# Patient Record
Sex: MALE | Employment: FULL TIME | URBAN - METROPOLITAN AREA
[De-identification: names, ages, dates, MRNs, and addresses within clinical notes are randomized per-mention and may not be internally consistent; named-entity substitution may affect disease eponyms.]

---

## 2019-07-28 ENCOUNTER — APPOINTMENT (OUTPATIENT)
Dept: ULTRASOUND IMAGING | Facility: CLINIC | Age: 59
End: 2019-07-28
Attending: EMERGENCY MEDICINE
Payer: COMMERCIAL

## 2019-07-28 ENCOUNTER — HOSPITAL ENCOUNTER (EMERGENCY)
Facility: CLINIC | Age: 59
Discharge: HOME | End: 2019-07-28
Attending: EMERGENCY MEDICINE | Admitting: EMERGENCY MEDICINE
Payer: COMMERCIAL

## 2019-07-28 VITALS
OXYGEN SATURATION: 99 % | HEIGHT: 70 IN | RESPIRATION RATE: 16 BRPM | WEIGHT: 175 LBS | DIASTOLIC BLOOD PRESSURE: 82 MMHG | HEART RATE: 58 BPM | TEMPERATURE: 97 F | BODY MASS INDEX: 25.05 KG/M2 | SYSTOLIC BLOOD PRESSURE: 143 MMHG

## 2019-07-28 DIAGNOSIS — N45.1 EPIDIDYMITIS: ICD-10-CM

## 2019-07-28 LAB
ALBUMIN UR-MCNC: NEGATIVE MG/DL
ANION GAP SERPL CALCULATED.3IONS-SCNC: 2 MMOL/L (ref 3–14)
APPEARANCE UR: CLEAR
BASOPHILS # BLD AUTO: 0 10E9/L (ref 0–0.2)
BASOPHILS NFR BLD AUTO: 0.4 %
BILIRUB UR QL STRIP: NEGATIVE
BUN SERPL-MCNC: 14 MG/DL (ref 7–30)
CALCIUM SERPL-MCNC: 8.6 MG/DL (ref 8.5–10.1)
CHLORIDE SERPL-SCNC: 108 MMOL/L (ref 94–109)
CO2 SERPL-SCNC: 31 MMOL/L (ref 20–32)
COLOR UR AUTO: YELLOW
CREAT SERPL-MCNC: 1.05 MG/DL (ref 0.66–1.25)
DIFFERENTIAL METHOD BLD: ABNORMAL
EOSINOPHIL # BLD AUTO: 0.1 10E9/L (ref 0–0.7)
EOSINOPHIL NFR BLD AUTO: 1.6 %
ERYTHROCYTE [DISTWIDTH] IN BLOOD BY AUTOMATED COUNT: 14.1 % (ref 10–15)
GFR SERPL CREATININE-BSD FRML MDRD: 78 ML/MIN/{1.73_M2}
GLUCOSE SERPL-MCNC: 107 MG/DL (ref 70–99)
GLUCOSE UR STRIP-MCNC: NEGATIVE MG/DL
HCT VFR BLD AUTO: 39.9 % (ref 40–53)
HGB BLD-MCNC: 13.1 G/DL (ref 13.3–17.7)
HGB UR QL STRIP: NEGATIVE
IMM GRANULOCYTES # BLD: 0 10E9/L (ref 0–0.4)
IMM GRANULOCYTES NFR BLD: 0.2 %
KETONES UR STRIP-MCNC: 10 MG/DL
LEUKOCYTE ESTERASE UR QL STRIP: NEGATIVE
LYMPHOCYTES # BLD AUTO: 1.8 10E9/L (ref 0.8–5.3)
LYMPHOCYTES NFR BLD AUTO: 32.2 %
MCH RBC QN AUTO: 27.1 PG (ref 26.5–33)
MCHC RBC AUTO-ENTMCNC: 32.8 G/DL (ref 31.5–36.5)
MCV RBC AUTO: 83 FL (ref 78–100)
MONOCYTES # BLD AUTO: 0.3 10E9/L (ref 0–1.3)
MONOCYTES NFR BLD AUTO: 5.5 %
NEUTROPHILS # BLD AUTO: 3.3 10E9/L (ref 1.6–8.3)
NEUTROPHILS NFR BLD AUTO: 60.1 %
NITRATE UR QL: NEGATIVE
NRBC # BLD AUTO: 0 10*3/UL
NRBC BLD AUTO-RTO: 0 /100
PH UR STRIP: 8 PH (ref 5–7)
PLATELET # BLD AUTO: 220 10E9/L (ref 150–450)
POTASSIUM SERPL-SCNC: 3.5 MMOL/L (ref 3.4–5.3)
RBC # BLD AUTO: 4.83 10E12/L (ref 4.4–5.9)
RBC #/AREA URNS AUTO: 1 /HPF (ref 0–2)
SODIUM SERPL-SCNC: 141 MMOL/L (ref 133–144)
SOURCE: ABNORMAL
SP GR UR STRIP: 1.01 (ref 1–1.03)
UROBILINOGEN UR STRIP-MCNC: NORMAL MG/DL (ref 0–2)
WBC # BLD AUTO: 5.5 10E9/L (ref 4–11)
WBC #/AREA URNS AUTO: 1 /HPF (ref 0–5)

## 2019-07-28 PROCEDURE — 96374 THER/PROPH/DIAG INJ IV PUSH: CPT

## 2019-07-28 PROCEDURE — 85025 COMPLETE CBC W/AUTO DIFF WBC: CPT | Performed by: EMERGENCY MEDICINE

## 2019-07-28 PROCEDURE — 96360 HYDRATION IV INFUSION INIT: CPT

## 2019-07-28 PROCEDURE — 99285 EMERGENCY DEPT VISIT HI MDM: CPT | Mod: 25

## 2019-07-28 PROCEDURE — 25000128 H RX IP 250 OP 636: Performed by: EMERGENCY MEDICINE

## 2019-07-28 PROCEDURE — 93976 VASCULAR STUDY: CPT

## 2019-07-28 PROCEDURE — 80048 BASIC METABOLIC PNL TOTAL CA: CPT | Performed by: EMERGENCY MEDICINE

## 2019-07-28 PROCEDURE — 81001 URINALYSIS AUTO W/SCOPE: CPT | Performed by: EMERGENCY MEDICINE

## 2019-07-28 PROCEDURE — 96375 TX/PRO/DX INJ NEW DRUG ADDON: CPT

## 2019-07-28 PROCEDURE — 96372 THER/PROPH/DIAG INJ SC/IM: CPT

## 2019-07-28 RX ORDER — DOXYCYCLINE 100 MG/1
100 CAPSULE ORAL 2 TIMES DAILY
Qty: 20 CAPSULE | Refills: 0 | Status: SHIPPED | OUTPATIENT
Start: 2019-07-28 | End: 2019-08-07

## 2019-07-28 RX ORDER — ONDANSETRON 2 MG/ML
4 INJECTION INTRAMUSCULAR; INTRAVENOUS ONCE
Status: COMPLETED | OUTPATIENT
Start: 2019-07-28 | End: 2019-07-28

## 2019-07-28 RX ORDER — KETOROLAC TROMETHAMINE 30 MG/ML
30 INJECTION, SOLUTION INTRAMUSCULAR; INTRAVENOUS ONCE
Status: COMPLETED | OUTPATIENT
Start: 2019-07-28 | End: 2019-07-28

## 2019-07-28 RX ORDER — CEFTRIAXONE SODIUM 250 MG
250 VIAL (EA) INJECTION ONCE
Status: COMPLETED | OUTPATIENT
Start: 2019-07-28 | End: 2019-07-28

## 2019-07-28 RX ORDER — OXYCODONE AND ACETAMINOPHEN 5; 325 MG/1; MG/1
1-2 TABLET ORAL EVERY 4 HOURS PRN
Qty: 12 TABLET | Refills: 0 | Status: SHIPPED | OUTPATIENT
Start: 2019-07-28 | End: 2021-11-16

## 2019-07-28 RX ORDER — MORPHINE SULFATE 4 MG/ML
4 INJECTION, SOLUTION INTRAMUSCULAR; INTRAVENOUS ONCE
Status: COMPLETED | OUTPATIENT
Start: 2019-07-28 | End: 2019-07-28

## 2019-07-28 RX ADMIN — KETOROLAC TROMETHAMINE 30 MG: 30 INJECTION, SOLUTION INTRAMUSCULAR at 16:47

## 2019-07-28 RX ADMIN — MORPHINE SULFATE 4 MG: 4 INJECTION INTRAVENOUS at 15:45

## 2019-07-28 RX ADMIN — CEFTRIAXONE SODIUM 250 MG: 250 INJECTION, POWDER, FOR SOLUTION INTRAMUSCULAR; INTRAVENOUS at 17:11

## 2019-07-28 RX ADMIN — SODIUM CHLORIDE 1000 ML: 9 INJECTION, SOLUTION INTRAVENOUS at 15:46

## 2019-07-28 RX ADMIN — ONDANSETRON 4 MG: 2 INJECTION INTRAMUSCULAR; INTRAVENOUS at 15:45

## 2019-07-28 ASSESSMENT — ENCOUNTER SYMPTOMS
DYSURIA: 0
HEMATURIA: 0
BACK PAIN: 1
FLANK PAIN: 1
DIFFICULTY URINATING: 0

## 2019-07-28 ASSESSMENT — MIFFLIN-ST. JEOR: SCORE: 1625.04

## 2019-07-28 NOTE — ED PROVIDER NOTES
"  History     Chief Complaint:  Right Testicular, Back, and Flank Pain    The history is provided by the patient.      Makenna Abbott is a 59 year old male with history significant for CML who presents with right testicular pain for the past 48 hours. Patient annotates her testicular radiates superorly to his right lower back and right flank two days ago. He highlights movement has exacerbated his pain and affirms secondary nausea during bouts of discomfort. Patient endorses history of similar symptoms with epididymitis a few years ago. Wife highlights concerns for elevated creatinine noted in his last primary evaluation. He denies any dysuria, difficulty urinating, hematuria, or history of kidney stones. He adds he likely took ibuprofen x4 this morning.     Allergies:  No Known Drug Allergies    Medications:    He denies taking any daily medications.     Past Medical History:    CML  Epididymitis  Gilbert's syndrome  ED  GERD  Lumbar disc herniation    Past Surgical History:    Rectal fissure  LASIK  Excise variococele     Family History:    No past pertinent family history.    Social History:  The patient was accompanied to the ED by his wife.  Smoking Status: Never Smoker  Smokeless Tobacco: Never Used  Alcohol Use: Positive  Marital Status:       Review of Systems   Genitourinary: Positive for flank pain and testicular pain. Negative for difficulty urinating, dysuria and hematuria.   Musculoskeletal: Positive for back pain.   All other systems reviewed and are negative.    Physical Exam   Patient Vitals for the past 24 hrs:   BP Temp Temp src Pulse Resp SpO2 Height Weight   07/28/19 1452 158/76 97  F (36.1  C) Oral 58 16 100 % 1.778 m (5' 10\") 79.4 kg (175 lb)     Physical Exam   Constitutional:  Patient is oriented to person, place, and time. They appear well-developed and well-nourished. Mild distress secondary to pain   HENT:   Mouth/Throat:   Oropharynx is clear and moist.   Eyes:    Conjunctivae " normal and EOM are normal. Pupils are equal, round, and reactive to light.   Neck:    Normal range of motion.   Cardiovascular: Normal rate, regular rhythm and normal heart sounds.  Exam reveals no gallop and no friction rub.  No murmur heard.  Pulmonary/Chest:  Effort normal and breath sounds normal. Patient has no wheezes. Patient has no rales.   Abdominal:   Soft. Bowel sounds are normal. Patient exhibits no mass. There is no tenderness. There is no rebound and no guarding.   Genitourinary:  Patient has pain to palpation on the superior aspect of the right testicle.  Scrotum shows no erythema.  Left testicle nontender.  Musculoskeletal:  Normal range of motion. Patient exhibits no edema.   Neurological:   Patient is alert and oriented to person, place, and time. Patient has normal strength. No cranial nerve deficit or sensory deficit. GCS 15  Skin:   Skin is warm and dry. No rash noted. No erythema.   Psychiatric:   Patient has a normal mood and affect. Patient's behavior is normal. Judgment and thought content normal.       Emergency Department Course     Imaging:  US Testicular & Scrotum w Doppler Ltd  1. Heterogeneous enlargement of the right epididymis that also appears hypervascular. This may represent a right-sided epididymitis.  2. Moderate left hydrocele.  3. No acute testicular abnormality.  Reading per radiology    Laboratory:  CBC: WBC 5.5, HGB 13.1 (L),   BMP: anion gap 2 (L) glc 107 (H) o/w WNL (Creatinine 1.05)  UA: ketones 10 (A) pH 8.0 (H) o/w WNL    Interventions:  1545: Morphine 4 mg IV  1545: Zofran 4 mg IV  1546: NS 1L IV Bolus   1647: Toradol 30 mg IV  Rocephin 250 mg IM    Emergency Department Course:  1455 Nursing notes and vitals reviewed.  1520 I performed an exam of the patient as documented above.   1532 IV was inserted and blood was drawn for laboratory testing, results above.  1604 The patient provided a urine sample here in the emergency department. This was sent for  laboratory testing, findings above.  1626 The patient was sent for a US while in the emergency department, results above.   1652 Patient reassessed and updated on work-up thus far.   I personally reviewed the imaging and laboratory results with the patient and answered all related questions prior to discharge, anticipatory guidance given.    Impression & Plan      Medical Decision Making:  Makenna Abbott is a 59 year old male who presents to the emergency department today for evaluation of right testicular pain.  Basic blood work was obtained as well as urine.  His creatinine is now normal.  Urine shows no evidence of infection or blood.  Ultrasound was performed which does show findings consistent with epididymitis.  No torsion.  No masses.  I did discuss with him whether he sexually active.  He is monogamous with his wife.  No history of STDs.  I did note last time he was here for his epididymitis we gave him Rocephin and doxycycline with successful treatment.  I will give him the same treatment today.  I will write him for Percocet for pain and refer him back to Dr. Gomez for follow-up.    Diagnosis:    ICD-10-CM   1. Epididymitis N45.1     Disposition: Home    Discharge Medications:  oxyCODONE-acetaminophen 5-325 MG tablet  Commonly known as:  PERCOCET      Dose:  1-2 tablet  Take 1-2 tablets by mouth every 4 hours as needed for pain  Quantity:  12 tablet  Refills:  0       Scribe Disclosure:  I, Minesh Bautista, am serving as a scribe at 4:45 PM on 7/28/2019 to document services personally performed by Marta Walls MD based on my observations and the provider's statements to me.      EMERGENCY DEPARTMENT       Marta Walls MD  07/28/19 6109

## 2019-07-28 NOTE — ED AVS SNAPSHOT
Emergency Department  [address] [facility] [address]  Phone:  [phone]  Fax:  [phone]                                    Makenna Abbott   MRN: [id number]    Department:   Emergency Department   Date of Visit:  7/28/2019           After Visit Summary Signature Page    I have received my discharge instructions, and my questions have been answered. I have discussed any challenges I see with this plan with the nurse or doctor.    ..........................................................................................................................................  Patient/Patient Representative Signature      ..........................................................................................................................................  Patient Representative Print Name and Relationship to Patient    ..................................................               ................................................  Date                                   Time    ..........................................................................................................................................  Reviewed by Signature/Title    ...................................................              ..............................................  Date                                               Time          22EPIC Rev 08/18

## 2019-07-29 ENCOUNTER — NURSE TRIAGE (OUTPATIENT)
Dept: NURSING | Facility: CLINIC | Age: 59
End: 2019-07-29

## 2019-07-29 NOTE — TELEPHONE ENCOUNTER
Reason for call;  Wife called without Pt .  See yesterday at [facility]  ED for Epdidymitis , and awaiting call back about appt from Urologist  Dr Gomez ( established Pt &  had done surgery W Dr Gomez before  ).  Problem is  Oxycodone is not helping with pain .   Recommendation / teaching ; FNA  Reviewed discharge instructions from [facility] ED 7/28/19 with wife . FNA recommended calling back to speak to Dr MYESHA Gomez nurse for 2nd level opinion and if can get squeezed in for appt or help with pain Rx or recommendation to go to ED or other and wife agrees.      Caller Verbalizes understanding and denies further questions and will call back if Pt available for  Symptom  triage or caller  questions  .  Shawna Dalton RN  - [place] Nurse Advisor

## 2019-08-09 ENCOUNTER — APPOINTMENT (OUTPATIENT)
Dept: CT IMAGING | Facility: CLINIC | Age: 59
End: 2019-08-09
Attending: EMERGENCY MEDICINE
Payer: COMMERCIAL

## 2019-08-09 ENCOUNTER — HOSPITAL ENCOUNTER (EMERGENCY)
Facility: CLINIC | Age: 59
Discharge: HOME | End: 2019-08-09
Attending: EMERGENCY MEDICINE | Admitting: EMERGENCY MEDICINE
Payer: COMMERCIAL

## 2019-08-09 ENCOUNTER — APPOINTMENT (OUTPATIENT)
Dept: ULTRASOUND IMAGING | Facility: CLINIC | Age: 59
End: 2019-08-09
Attending: EMERGENCY MEDICINE
Payer: COMMERCIAL

## 2019-08-09 VITALS
TEMPERATURE: 97.6 F | HEIGHT: 70 IN | HEART RATE: 56 BPM | BODY MASS INDEX: 25.91 KG/M2 | OXYGEN SATURATION: 97 % | SYSTOLIC BLOOD PRESSURE: 146 MMHG | DIASTOLIC BLOOD PRESSURE: 85 MMHG | WEIGHT: 181 LBS | RESPIRATION RATE: 16 BRPM

## 2019-08-09 DIAGNOSIS — N45.3 EPIDIDYMOORCHITIS: ICD-10-CM

## 2019-08-09 LAB
ALBUMIN UR-MCNC: NEGATIVE MG/DL
ANION GAP SERPL CALCULATED.3IONS-SCNC: 6 MMOL/L (ref 3–14)
APPEARANCE UR: CLEAR
BASOPHILS # BLD AUTO: 0 10E9/L (ref 0–0.2)
BASOPHILS NFR BLD AUTO: 0.3 %
BILIRUB UR QL STRIP: NEGATIVE
BUN SERPL-MCNC: 17 MG/DL (ref 7–30)
CALCIUM SERPL-MCNC: 8.6 MG/DL (ref 8.5–10.1)
CHLORIDE SERPL-SCNC: 106 MMOL/L (ref 94–109)
CO2 SERPL-SCNC: 29 MMOL/L (ref 20–32)
COLOR UR AUTO: YELLOW
CREAT SERPL-MCNC: 1.15 MG/DL (ref 0.66–1.25)
DIFFERENTIAL METHOD BLD: NORMAL
EOSINOPHIL # BLD AUTO: 0.2 10E9/L (ref 0–0.7)
EOSINOPHIL NFR BLD AUTO: 1.9 %
ERYTHROCYTE [DISTWIDTH] IN BLOOD BY AUTOMATED COUNT: 14.3 % (ref 10–15)
GFR SERPL CREATININE-BSD FRML MDRD: 70 ML/MIN/{1.73_M2}
GLUCOSE SERPL-MCNC: 101 MG/DL (ref 70–99)
GLUCOSE UR STRIP-MCNC: NEGATIVE MG/DL
HCT VFR BLD AUTO: 42.3 % (ref 40–53)
HGB BLD-MCNC: 14.1 G/DL (ref 13.3–17.7)
HGB UR QL STRIP: NEGATIVE
IMM GRANULOCYTES # BLD: 0.1 10E9/L (ref 0–0.4)
IMM GRANULOCYTES NFR BLD: 0.5 %
KETONES UR STRIP-MCNC: NEGATIVE MG/DL
LEUKOCYTE ESTERASE UR QL STRIP: NEGATIVE
LYMPHOCYTES # BLD AUTO: 3.2 10E9/L (ref 0.8–5.3)
LYMPHOCYTES NFR BLD AUTO: 33.6 %
MCH RBC QN AUTO: 27.5 PG (ref 26.5–33)
MCHC RBC AUTO-ENTMCNC: 33.3 G/DL (ref 31.5–36.5)
MCV RBC AUTO: 83 FL (ref 78–100)
MONOCYTES # BLD AUTO: 0.8 10E9/L (ref 0–1.3)
MONOCYTES NFR BLD AUTO: 8.7 %
MUCOUS THREADS #/AREA URNS LPF: PRESENT /LPF
NEUTROPHILS # BLD AUTO: 5.2 10E9/L (ref 1.6–8.3)
NEUTROPHILS NFR BLD AUTO: 55 %
NITRATE UR QL: NEGATIVE
NRBC # BLD AUTO: 0 10*3/UL
NRBC BLD AUTO-RTO: 0 /100
PH UR STRIP: 8 PH (ref 5–7)
PLATELET # BLD AUTO: 224 10E9/L (ref 150–450)
POTASSIUM SERPL-SCNC: 3.8 MMOL/L (ref 3.4–5.3)
RBC # BLD AUTO: 5.12 10E12/L (ref 4.4–5.9)
RBC #/AREA URNS AUTO: <1 /HPF (ref 0–2)
SODIUM SERPL-SCNC: 141 MMOL/L (ref 133–144)
SOURCE: ABNORMAL
SP GR UR STRIP: 1.02 (ref 1–1.03)
UROBILINOGEN UR STRIP-MCNC: NORMAL MG/DL (ref 0–2)
WBC # BLD AUTO: 9.7 10E9/L (ref 4–11)
WBC #/AREA URNS AUTO: <1 /HPF (ref 0–5)

## 2019-08-09 PROCEDURE — 25000132 ZZH RX MED GY IP 250 OP 250 PS 637: Performed by: EMERGENCY MEDICINE

## 2019-08-09 PROCEDURE — 81001 URINALYSIS AUTO W/SCOPE: CPT | Performed by: EMERGENCY MEDICINE

## 2019-08-09 PROCEDURE — 74176 CT ABD & PELVIS W/O CONTRAST: CPT

## 2019-08-09 PROCEDURE — 80048 BASIC METABOLIC PNL TOTAL CA: CPT | Performed by: EMERGENCY MEDICINE

## 2019-08-09 PROCEDURE — 99285 EMERGENCY DEPT VISIT HI MDM: CPT | Mod: 25

## 2019-08-09 PROCEDURE — 85025 COMPLETE CBC W/AUTO DIFF WBC: CPT | Performed by: EMERGENCY MEDICINE

## 2019-08-09 PROCEDURE — 96374 THER/PROPH/DIAG INJ IV PUSH: CPT

## 2019-08-09 PROCEDURE — 96375 TX/PRO/DX INJ NEW DRUG ADDON: CPT

## 2019-08-09 PROCEDURE — 93976 VASCULAR STUDY: CPT

## 2019-08-09 PROCEDURE — 25000128 H RX IP 250 OP 636: Performed by: EMERGENCY MEDICINE

## 2019-08-09 PROCEDURE — 96376 TX/PRO/DX INJ SAME DRUG ADON: CPT

## 2019-08-09 RX ORDER — MORPHINE SULFATE 15 MG/1
15 TABLET ORAL EVERY 6 HOURS PRN
Qty: 15 TABLET | Refills: 0 | Status: SHIPPED | OUTPATIENT
Start: 2019-08-09 | End: 2021-11-16

## 2019-08-09 RX ORDER — KETOROLAC TROMETHAMINE 15 MG/ML
15 INJECTION, SOLUTION INTRAMUSCULAR; INTRAVENOUS ONCE
Status: COMPLETED | OUTPATIENT
Start: 2019-08-09 | End: 2019-08-09

## 2019-08-09 RX ORDER — KETOROLAC TROMETHAMINE 10 MG/1
10 TABLET, FILM COATED ORAL EVERY 8 HOURS PRN
Qty: 15 TABLET | Refills: 0 | Status: SHIPPED | OUTPATIENT
Start: 2019-08-09 | End: 2021-11-16

## 2019-08-09 RX ORDER — LEVOFLOXACIN 500 MG/1
500 TABLET, FILM COATED ORAL DAILY
Qty: 10 TABLET | Refills: 0 | Status: SHIPPED | OUTPATIENT
Start: 2019-08-09 | End: 2019-08-19

## 2019-08-09 RX ORDER — LEVOFLOXACIN 500 MG/1
500 TABLET, FILM COATED ORAL ONCE
Status: COMPLETED | OUTPATIENT
Start: 2019-08-09 | End: 2019-08-09

## 2019-08-09 RX ORDER — HYDROMORPHONE HYDROCHLORIDE 1 MG/ML
0.5 INJECTION, SOLUTION INTRAMUSCULAR; INTRAVENOUS; SUBCUTANEOUS
Status: COMPLETED | OUTPATIENT
Start: 2019-08-09 | End: 2019-08-09

## 2019-08-09 RX ORDER — ONDANSETRON 4 MG/1
4 TABLET, ORALLY DISINTEGRATING ORAL EVERY 6 HOURS PRN
Qty: 10 TABLET | Refills: 0 | Status: SHIPPED | OUTPATIENT
Start: 2019-08-09 | End: 2019-08-12

## 2019-08-09 RX ADMIN — KETOROLAC TROMETHAMINE 15 MG: 15 INJECTION, SOLUTION INTRAMUSCULAR; INTRAVENOUS at 09:16

## 2019-08-09 RX ADMIN — LEVOFLOXACIN 500 MG: 500 TABLET, FILM COATED ORAL at 10:01

## 2019-08-09 RX ADMIN — HYDROMORPHONE HYDROCHLORIDE 0.5 MG: 1 INJECTION, SOLUTION INTRAMUSCULAR; INTRAVENOUS; SUBCUTANEOUS at 09:16

## 2019-08-09 RX ADMIN — HYDROMORPHONE HYDROCHLORIDE 0.5 MG: 1 INJECTION, SOLUTION INTRAMUSCULAR; INTRAVENOUS; SUBCUTANEOUS at 08:18

## 2019-08-09 RX ADMIN — HYDROMORPHONE HYDROCHLORIDE 0.5 MG: 1 INJECTION, SOLUTION INTRAMUSCULAR; INTRAVENOUS; SUBCUTANEOUS at 07:39

## 2019-08-09 ASSESSMENT — ENCOUNTER SYMPTOMS
MYALGIAS: 1
BACK PAIN: 1
DYSURIA: 0

## 2019-08-09 ASSESSMENT — MIFFLIN-ST. JEOR: SCORE: 1652.26

## 2019-08-09 NOTE — ED PROVIDER NOTES
History     Chief Complaint:  Groin Pain    HPI   Makenna Abbott is a 59 year old male with a history of epididymitis, lumbar disc herniation who presents to the emergency department for evaluation of groin pain. Of note, the patient was seen in [facility] ED on 7/28 for testicular pain radiating to his back, given Rocephin, morphine, Zofran, Toradol, and had US performed, as below; he was discharged with diagnosis of epididymitis (shown on US), prescribed doxycycline and Percocet. The patient reports his pain improved following treatment at the ED, but it has since returned, prompting his arrival to the ED. He describes the pain as right-sided groin pain radiating to his testicles, back, and right leg. He indicates he does have pressure with urination but denies dysuria. He notes [facility] has [facility], thinking they could be radicular in nature, and was given cortisone pills. He states he has finished his course of doxycycline.    US Testicular & Scrotum w Doppler Ltd 7/28/19  1. Heterogeneous enlargement of the right epididymis that also appears hypervascular. This may represent a right-sided epididymitis.  2. Moderate left hydrocele.  3. No acute testicular abnormality.  Reading per radiology    Allergies:  NKDA     Medications:    Percocet   Doxycycline    Past Medical History:    CML  Epididymitis  Gilbert's syndrome  ED  GERD  Depression  Anxiety  Lumbar disc herniation    Past Surgical History:    Rectal fissure repair  Lasik surgery  Excise varicocele    Family History:    No past pertinent family history.    Social History:  Presents with wife.  Never smoker.  Positive for alcohol use.   Marital Status:   [2]     Review of Systems   Genitourinary: Positive for testicular pain. Negative for dysuria.   Musculoskeletal: Positive for back pain and myalgias.   All other systems reviewed and are negative.      Physical Exam     Patient Vitals for the past 24 hrs:   BP Temp Temp  "src Pulse Resp SpO2 Height Weight   08/09/19 0925 (!) 158/82 -- -- 56 -- 97 % -- --   08/09/19 0745 -- -- -- -- -- 97 % -- --   08/09/19 0740 -- -- -- -- -- 99 % -- --   08/09/19 0739 -- -- -- -- 16 -- -- --   08/09/19 0708 (!) 155/85 97.6  F (36.4  C) Oral 58 18 100 % 1.778 m (5' 10\") 82.1 kg (181 lb)     Physical Exam  General/Appearance: appears stated age, well-groomed, appears comfortable  Eyes: EOMI, no scleral injection, no icterus  ENT: MMM  Neck: supple, nl ROM, no stiffness  Cardiovascular: RRR, nl S1S2, no m/r/g, 2+ pulses in all 4 extremities, cap refill <2sec  Respiratory: CTAB, good air movement throughout, no wheezes/rhonchi/rales, no increased WOB, no retractions  Back: no lesions  GI: abd soft, non-distended, nttp,  no HSM, no rebound, no guarding, nl BS  : pain to general R testicle, nl lie, no lesions  MSK: CESAR, good tone, no bony abnormality  Skin: warm and well-perfused, no rash, no edema, no ecchymosis, nl turgor  Neuro: GCS 15, alert and oriented, no gross focal neuro deficits  Psych: interacts appropriately  Heme: no petechia, no purpura, no active bleeding    Emergency Department Course     Imaging:  Radiographic findings were communicated with the patient who voiced understanding of the findings.    US Testicular & Scrotum w Doppler Ltd:  1. The right epididymis now has a more normal appearance with normal  blood flow. Incidental note made of an appendix epididymis.  2. No evidence of right inguinal hernia.  3. Small simple cysts in the testes bilaterally as above.  4. 1.1 x 0.7 x 0.7 cm left epididymal head cyst. As per radiology.    CT Abdomen/Pelvis w/o IV contrast-stone protocol:   Unremarkable noncontrast CT of the abdomen and pelvis. No  evidence of urinary tract stone or hydronephrosis. No evidence of  inguinal hernia. No acute findings. As per radiology.     Laboratory:  CBC: WBC: 9.7, HGB: 14.1, PLT: 224  BMP: Glucose 101 (H), o/w WNL (Creatinine: 1.15)    UA with micro: pH 8.0 " (H), Mucous Present, o/w negative    Interventions:  0739 Dilaudid 0.5 mg IV  0818 Dilaudid 0.5 mg IV  0916 Dilaudid 0.5 mg IV   Toradol 15 mg IV  1001 Levaquin 500 mg PO    Emergency Department Course:  Nursing notes and vitals reviewed. 0719 I performed an exam of the patient as documented above.     IV inserted. Medicine administered as documented above. Blood drawn. This was sent to the lab for further testing, results above.    The patient provided a urine sample here in the emergency department. This was sent for laboratory testing, findings above.     The patient was sent for a US Testicular & Scrotum while in the emergency department, findings above.     0846 I rechecked the patient and discussed the results of his workup thus far.     0942 I rechecked and updated the patient.    Findings and plan explained to the Patient. Patient discharged home with instructions regarding supportive care, medications, and reasons to return. The importance of close follow-up was reviewed. The patient was prescribed Toradol, Levaquin, morphine, Zofran.    I personally reviewed the laboratory results with the Patient and answered all related questions prior to discharge.    Impression & Plan      Medical Decision Making:  This patient is a 59-year-old male who recently was diagnosed with epididymitis and treated with 10 days of doxycycline as well as IM ceftriaxone who presents with continued pain.  Antibiotics is finished yesterday.  He does have generalized right testicular tenderness to palpation.  Ultrasound is normal however I wonder if this is just mostly-treated epididymitis/orchitis.  After the rest of the work-up is come back as normal I think is reasonable to start him on another dose of different antibiotics as well as continued pain meds.  We encouraged other supportive measures of care.  He does have an appointment with Dr. Gomez with urology but not until the 23rd.  I also considered kidney stone, hernia,  radicular symptoms.  CT was obtained which was negative.  Neurologically this is still possible however would be awfully coincidental in light of pure testicular pain with a recent positive ultrasound.  Because of this, again, I think is reasonable to try a second course of antibiotics aimed at a localized infection.  Patient and his wife feel comfortable with this plan.    Diagnosis:    ICD-10-CM   1. Epididymoorchitis N45.3       Disposition:  discharged to home    Discharge Medications:     Medication List      Started    ketorolac 10 MG tablet  Commonly known as:  TORADOL  10 mg, Oral, EVERY 8 HOURS PRN     levofloxacin 500 MG tablet  Commonly known as:  LEVAQUIN  500 mg, Oral, DAILY     morphine 15 MG IR tablet  Commonly known as:  MSIR  15 mg, Oral, EVERY 6 HOURS PRN     ondansetron 4 MG ODT tab  Commonly known as:  ZOFRAN ODT  4 mg, Oral, EVERY 6 HOURS PRN          Kike YOUNG, anabela serving as a scribe on 8/9/2019 at 7:14 AM to personally document services performed by Kusum Adkins* based on my observations and the provider's statements to me.     Kike Rod  8/9/2019    EMERGENCY DEPARTMENT       Kuusm Adkins MD  08/09/19 1035

## 2019-08-09 NOTE — ED TRIAGE NOTES
Right groin pain, here last week for same.   Pain came again this morning.   Taking antibiotics & medications prescribed.

## 2019-08-09 NOTE — ED AVS SNAPSHOT
Emergency Department  [address] [facility] [address]  Phone:  [phone]  Fax:  [phone]                                    Makenna Abbott   MRN: [id number]    Department:   Emergency Department   Date of Visit:  8/9/2019           After Visit Summary Signature Page    I have received my discharge instructions, and my questions have been answered. I have discussed any challenges I see with this plan with the nurse or doctor.    ..........................................................................................................................................  Patient/Patient Representative Signature      ..........................................................................................................................................  Patient Representative Print Name and Relationship to Patient    ..................................................               ................................................  Date                                   Time    ..........................................................................................................................................  Reviewed by Signature/Title    ...................................................              ..............................................  Date                                               Time          22EPIC Rev 08/18

## 2021-03-08 ENCOUNTER — IMMUNIZATION (OUTPATIENT)
Dept: PEDIATRICS | Facility: CLINIC | Age: 61
End: 2021-03-08
Payer: COMMERCIAL

## 2021-03-08 PROCEDURE — 91300 PR COVID VAC PFIZER DIL RECON 30 MCG/0.3 ML IM: CPT

## 2021-03-08 PROCEDURE — 0001A PR COVID VAC PFIZER DIL RECON 30 MCG/0.3 ML IM: CPT

## 2021-03-21 ENCOUNTER — HEALTH MAINTENANCE LETTER (OUTPATIENT)
Age: 61
End: 2021-03-21

## 2021-04-01 ENCOUNTER — APPOINTMENT (OUTPATIENT)
Dept: GENERAL RADIOLOGY | Facility: CLINIC | Age: 61
End: 2021-04-01
Attending: EMERGENCY MEDICINE
Payer: COMMERCIAL

## 2021-04-01 ENCOUNTER — HOSPITAL ENCOUNTER (EMERGENCY)
Facility: CLINIC | Age: 61
Discharge: HOME | End: 2021-04-01
Attending: EMERGENCY MEDICINE | Admitting: EMERGENCY MEDICINE
Payer: COMMERCIAL

## 2021-04-01 VITALS
RESPIRATION RATE: 14 BRPM | TEMPERATURE: 97.9 F | HEIGHT: 70 IN | HEART RATE: 60 BPM | BODY MASS INDEX: 25.77 KG/M2 | DIASTOLIC BLOOD PRESSURE: 78 MMHG | OXYGEN SATURATION: 96 % | SYSTOLIC BLOOD PRESSURE: 135 MMHG | WEIGHT: 180 LBS

## 2021-04-01 DIAGNOSIS — M54.50 ACUTE LEFT-SIDED LOW BACK PAIN WITHOUT SCIATICA: ICD-10-CM

## 2021-04-01 LAB
ANION GAP SERPL CALCULATED.3IONS-SCNC: 2 MMOL/L (ref 3–14)
BUN SERPL-MCNC: 18 MG/DL (ref 7–30)
CALCIUM SERPL-MCNC: 8.8 MG/DL (ref 8.5–10.1)
CHLORIDE SERPL-SCNC: 107 MMOL/L (ref 94–109)
CO2 SERPL-SCNC: 29 MMOL/L (ref 20–32)
CREAT SERPL-MCNC: 1 MG/DL (ref 0.66–1.25)
GFR SERPL CREATININE-BSD FRML MDRD: 82 ML/MIN/{1.73_M2}
GLUCOSE SERPL-MCNC: 99 MG/DL (ref 70–99)
POTASSIUM SERPL-SCNC: 4.4 MMOL/L (ref 3.4–5.3)
SODIUM SERPL-SCNC: 138 MMOL/L (ref 133–144)

## 2021-04-01 PROCEDURE — 258N000003 HC RX IP 258 OP 636: Performed by: EMERGENCY MEDICINE

## 2021-04-01 PROCEDURE — 96375 TX/PRO/DX INJ NEW DRUG ADDON: CPT

## 2021-04-01 PROCEDURE — 250N000011 HC RX IP 250 OP 636: Performed by: EMERGENCY MEDICINE

## 2021-04-01 PROCEDURE — 96361 HYDRATE IV INFUSION ADD-ON: CPT

## 2021-04-01 PROCEDURE — 250N000013 HC RX MED GY IP 250 OP 250 PS 637: Performed by: EMERGENCY MEDICINE

## 2021-04-01 PROCEDURE — 73502 X-RAY EXAM HIP UNI 2-3 VIEWS: CPT

## 2021-04-01 PROCEDURE — 99284 EMERGENCY DEPT VISIT MOD MDM: CPT | Mod: 25

## 2021-04-01 PROCEDURE — 80048 BASIC METABOLIC PNL TOTAL CA: CPT | Performed by: EMERGENCY MEDICINE

## 2021-04-01 PROCEDURE — 96374 THER/PROPH/DIAG INJ IV PUSH: CPT

## 2021-04-01 RX ORDER — KETOROLAC TROMETHAMINE 15 MG/ML
15 INJECTION, SOLUTION INTRAMUSCULAR; INTRAVENOUS ONCE
Status: COMPLETED | OUTPATIENT
Start: 2021-04-01 | End: 2021-04-01

## 2021-04-01 RX ORDER — CYCLOBENZAPRINE HCL 10 MG
10 TABLET ORAL ONCE
Status: COMPLETED | OUTPATIENT
Start: 2021-04-01 | End: 2021-04-01

## 2021-04-01 RX ORDER — ACETAMINOPHEN 325 MG/1
975 TABLET ORAL ONCE
Status: COMPLETED | OUTPATIENT
Start: 2021-04-01 | End: 2021-04-01

## 2021-04-01 RX ORDER — LIDOCAINE 50 MG/G
1 PATCH TOPICAL EVERY 24 HOURS
Qty: 10 PATCH | Refills: 0 | Status: SHIPPED | OUTPATIENT
Start: 2021-04-01 | End: 2021-04-11

## 2021-04-01 RX ORDER — ONDANSETRON 2 MG/ML
4 INJECTION INTRAMUSCULAR; INTRAVENOUS EVERY 30 MIN PRN
Status: DISCONTINUED | OUTPATIENT
Start: 2021-04-01 | End: 2021-04-01 | Stop reason: HOSPADM

## 2021-04-01 RX ORDER — CYCLOBENZAPRINE HCL 10 MG
10 TABLET ORAL 3 TIMES DAILY PRN
Qty: 21 TABLET | Refills: 0 | Status: SHIPPED | OUTPATIENT
Start: 2021-04-01 | End: 2021-04-08

## 2021-04-01 RX ORDER — LIDOCAINE 4 G/G
1 PATCH TOPICAL ONCE
Status: DISCONTINUED | OUTPATIENT
Start: 2021-04-01 | End: 2021-04-01 | Stop reason: HOSPADM

## 2021-04-01 RX ORDER — ONDANSETRON 4 MG/1
4 TABLET, ORALLY DISINTEGRATING ORAL EVERY 8 HOURS PRN
Qty: 10 TABLET | Refills: 0 | Status: SHIPPED | OUTPATIENT
Start: 2021-04-01 | End: 2021-11-16

## 2021-04-01 RX ORDER — SODIUM CHLORIDE 9 MG/ML
INJECTION, SOLUTION INTRAVENOUS CONTINUOUS
Status: DISCONTINUED | OUTPATIENT
Start: 2021-04-01 | End: 2021-04-01 | Stop reason: HOSPADM

## 2021-04-01 RX ADMIN — SODIUM CHLORIDE 1000 ML: 9 INJECTION, SOLUTION INTRAVENOUS at 17:12

## 2021-04-01 RX ADMIN — ONDANSETRON 4 MG: 2 INJECTION INTRAMUSCULAR; INTRAVENOUS at 17:11

## 2021-04-01 RX ADMIN — CYCLOBENZAPRINE 10 MG: 10 TABLET, FILM COATED ORAL at 17:55

## 2021-04-01 RX ADMIN — ACETAMINOPHEN 975 MG: 325 TABLET ORAL at 18:37

## 2021-04-01 RX ADMIN — KETOROLAC TROMETHAMINE 15 MG: 15 INJECTION, SOLUTION INTRAMUSCULAR; INTRAVENOUS at 18:11

## 2021-04-01 RX ADMIN — LIDOCAINE 1 PATCH: 560 PATCH PERCUTANEOUS; TOPICAL; TRANSDERMAL at 17:56

## 2021-04-01 ASSESSMENT — MIFFLIN-ST. JEOR: SCORE: 1642.72

## 2021-04-01 NOTE — ED TRIAGE NOTES
Non traumatic lower back pain with pain radiating down to both leg for long time, been on Toradol, pain has been getting worse. Started to have nausea vomiting on the flight back home from [place] today. Had MRI done last week showed L4-L5 disc problems.

## 2021-04-01 NOTE — DISCHARGE INSTRUCTIONS
Please see your PCP in 2-3 days for a recheck.  If you have increasing pain, loss of bowel or bladder function, numbness in your groin, unable to walk, fevers >101 or other acute changes, return to the ED.      May use flexeril as needed for pain.  Ok to use ibuprofen and tylenol.  Zofran take 20-30 min prior to pain medications for nausea control.  May use salonpas or lidoderm patches on areas of pain    Discharge Instructions  Back Pain  You were seen today for back pain. Back pain can have many causes, but most will get better without surgery or other specific treatment. Sometimes there is a herniated ( slipped ) disc. We do not usually do MRI scans to look for these right away, since most herniated discs will get better on their own with time.  Today, we did not find any evidence that your back pain was caused by a serious condition. However, sometimes symptoms develop over time and cannot be found during an emergency visit, so it is very important that you follow up with your primary provider.  Generally, every Emergency Department visit should have a follow-up clinic visit with either a primary or a specialty clinic/provider. Please follow-up as instructed by your emergency provider today.    Return to the Emergency Department if:  You develop a fever with your back pain.   You have weakness or change in sensation in one or both legs.  You lose control of your bowels or bladder, or cannot empty your bladder (cannot pee).  Your pain gets much worse.     Follow-up with your provider:  Unless your pain has completely gone away, please make an appointment with your provider within one week. Most of the routine care for back pain is available in a clinic and not the Emergency Department. You may need further management of your back pain, such as more pain medication, imaging such as an X-ray or MRI, or physical therapy.    What can I do to help myself?  Remain Active -- People are often afraid that they will hurt  their back further or delay recovery by remaining active, but this is one of the best things you can do for your back. In fact, staying in bed for a long time to rest is not recommended. Studies have shown that people with low back pain recover faster when they remain active. Movement helps to bring blood flow to the muscles and relieve muscle spasms as well as preventing loss of muscle strength.  Heat -- Using a heating pad can help with low back pain during the first few weeks. Do not sleep with a heating pad, as you can be burned.   Pain medications - You may take a pain medication such as Tylenol  (acetaminophen), Advil , Motrin  (ibuprofen) or Aleve  (naproxen).  If you were given a prescription for medicine here today, be sure to read all of the information (including the package insert) that comes with your prescription.  This will include important information about the medicine, its side effects, and any warnings that you need to know about.  The pharmacist who fills the prescription can provide more information and answer questions you may have about the medicine.  If you have questions or concerns that the pharmacist cannot address, please call or return to the Emergency Department.   Remember that you can always come back to the Emergency Department if you are not able to see your regular provider in the amount of time listed above, if you get any new symptoms, or if there is anything that worries you.

## 2021-04-01 NOTE — ED PROVIDER NOTES
History   Chief Complaint:  Back Pain    HPI   Makenna Abbott is a 60 year old male, with a history of chronic myelocytic leukemia and anxiety, who presents to the ED for evaluation of back pain. The patient reports he has had ongoing and worsening back pain over the past few days. He says he was performing chores around the house like living heavy objects and changing a water heater, and since then he has had increasing trouble performing physical therapy and cannot perform the exercises due to the lower back pain. He states he cannot stand for more than 10 seconds and has trouble performing ADLs. He notes that the lower back pain will radiate into the bilateral hips and then radiate down the left leg and into the heel. He notes he has become nauseous and vomited a few times today as well due to the pain. He is currently taking oxycodone and Tramadol for the pain, but is not having any relief. He says the pain he currently feels is about the same prior to when he had his MRI imaging performed. He conveys he did complete is first course of prednisone, but has not taken his Medrol-Dosepak as he had his first COVID-19 vaccination three days ago and does not know if he should take it. He denies any fever, chills, shortness of breath, or chest pain. He has not had surgery on his back. He notes he is trying to avoid surgery and the plan eventually is to obtain a lumbar epidural injection and then follow up in clinic. He has not been incontinent to urine or stool. He has not weakness or numbness. The patient has not had any recent falls or injuries. He has no abdominal pain.    Imaging-3/18/21  Lumbar spine MRI without contrast:  1. Multilevel worsening of lumbar spondylosis compared to the 2013 study.   2. At L4-5 there is new severe spinal canal stenosis due to disc bulge and central disc extrusion with superior migration. Moderate bilateral neural foramina narrowing.   3. At L3-4 there is worsening retrolisthesis,  "disc bulge and new right paracentral disc extrusion with inferior migration resulting in moderate spinal canal stenosis. Mild bilateral foraminal narrowing.   4. At L2-3 there is new slight retrolisthesis, disc bulge and a left paracentral disc protrusion resulting in mild left subarticular recess narrowing.   5. At L5-S1 there is similar disc degeneration with disc bulge, central disc protrusion and facet arthropathy resulting in moderate left subarticular recess narrowing and possible irritation of the traversing left S1 nerve roots. Moderate bilateral neural foramina narrowing.    Review of Systems   All other systems reviewed and are negative.    Allergies:  No known drug allergies    Medications:    Lexapro  Percocet  Tramadol    Past Medical History:    Epididymitis  Anxiety  Depression  BPH  GERD  Gilbert's syndrome  Erectile dysfunction  Chronic myelocytic leukemia    Past Surgical History:    Rectal fissure repair  Left variocele repair  Lasik    Family History:    Bladder cancer  Heart disease  Hypertension  Rheumatoid arthritis    Social History:  PCP: [facility]  Presents to the ED with spouse    Physical Exam     Patient Vitals for the past 24 hrs:   BP Temp Temp src Pulse Resp SpO2 Height Weight   04/01/21 1351 (!) 145/86 97.9  F (36.6  C) Oral 63 16 95 % 1.778 m (5' 10\") 81.6 kg (180 lb)       Physical Exam  General: Resting on the bed.  Head: No obvious trauma to head.  Ears, Nose, Throat:  External ears normal.  Nose normal.    Eyes:  Conjunctivae clear.  Pupils are equal, round, and reactive.   Neck: Normal range of motion.  Neck supple.   CV: Regular rate and rhythm.  No murmurs.      Respiratory: Effort normal and breath sounds normal.  No wheezing or crackles.   Gastrointestinal: Soft.  No distension. There is no tenderness.    Musculoskeletal: No tenderness midline palpation of the lumbar spine, no step-off or deformity.  Tenderness to palpation along the left SI joint and left hip.  " Achilles reflexes intact.  Sensation intact to light touch.  No saddle anesthesia.  5-5 motor strength to bilateral lower extremities.  Tenderness to palpation of the left hip, full range of motion to logroll and flexion extension.  Stable pelvis.  Neuro: Alert. Moving all extremities appropriately.  Normal speech.  No saddle anesthesia.    Skin: Skin is warm and dry.  No rash noted.       Emergency Department Course   Imaging:    XR Pelvis and hip, Left, 1 view:  Normal joint spaces and alignment. No fracture.    Imaging independently reviewed and agree with radiologist interpretation.      Laboratory:  BMP: Anion Gap 2 (L), o/w WNL (Creatinine 1.00)    Emergency Department Course:    Reviewed:  I reviewed the patient's nursing notes, vitals, past available medical records.     Assessments:  164: I obtained history and examined the patient as noted above.     : I rechecked the patient and explained findings.  Amendable and understands the plan. All questions were answered prior to discharge.     Interventions:  1711: NS 1L IV Bolus   1712: Zofran 4 mg IV  1755: Flexeril 10 mg PO  1756: Lidoderm 4% patch transdermal  1811: Toradol 15 mg IV  1837: Tylenol 975 mg PO    Disposition:  The patient was discharged to home.    Impression & Plan    Medical Decision Makin-year-old male presents with back pain.  Vital signs are stable.  Broad differential was pursued including not limited to musculoskeletal pain, fracture, dislocation, sprain strain, epidural abscess, epidural hematoma, discitis, etc.  BMP was checked just for kidney function and was normal.  Patient had a recent MRI that was unrevealing, no suggestion of acute fracture, dislocation, epidural abscess, epidural hematoma, discitis or other acute surgical emergency.  His pain is been constant since his recent MRI.  No indication for repeat MRI at this time.  X-ray of hip was obtained to rule out a fracture in the hip given some left lateral hip  discomfort, x-ray was negative.  We discussed pain control and follow-up with primary doctor.  We are able to get patient more comfortable with above interventions.  He felt comfortable to go home.  I also discussed prednisone with our clinical pharmacist and it was deemed okay to use as long as not for greater than 2 weeks as this may affect the COVID-19 vaccination the patient received on Monday.  This was relayed to the patient.  He is following up with a [facility] spine doctor and was encouraged to keep that appointment and continue to close follow-up.  He has a plan epidural injection as well.  At this point with his pain controlled he seems recently safe for discharge.  Close follow-up was advised.  Patient was discharged home with strict return precautions.    Diagnosis:    ICD-10-CM    1. Acute left-sided low back pain without sciatica  M54.5        Discharge Medications:  New Prescriptions    CYCLOBENZAPRINE (FLEXERIL) 10 MG TABLET    Take 1 tablet (10 mg) by mouth 3 times daily as needed for muscle spasms    LIDOCAINE (LIDODERM) 5 % PATCH    Place 1 patch onto the skin every 24 hours for 10 days    ONDANSETRON (ZOFRAN ODT) 4 MG ODT TAB    Take 1 tablet (4 mg) by mouth every 8 hours as needed for nausea     Scribe Disclosure:  I, Vaughn Kwong, am serving as a scribe at 4:47 PM on 4/1/2021 to document services personally performed by Vianey Tao MD based on my observations and the provider's statements to me.      Vianey Tao MD  04/01/21 [id number]

## 2021-09-04 ENCOUNTER — HEALTH MAINTENANCE LETTER (OUTPATIENT)
Age: 61
End: 2021-09-04

## 2021-11-16 ENCOUNTER — APPOINTMENT (OUTPATIENT)
Dept: ULTRASOUND IMAGING | Facility: CLINIC | Age: 61
End: 2021-11-16
Attending: EMERGENCY MEDICINE
Payer: COMMERCIAL

## 2021-11-16 ENCOUNTER — HOSPITAL ENCOUNTER (EMERGENCY)
Facility: CLINIC | Age: 61
Discharge: HOME | End: 2021-11-16
Attending: EMERGENCY MEDICINE | Admitting: EMERGENCY MEDICINE
Payer: COMMERCIAL

## 2021-11-16 VITALS
OXYGEN SATURATION: 96 % | HEIGHT: 70 IN | TEMPERATURE: 100.2 F | RESPIRATION RATE: 20 BRPM | BODY MASS INDEX: 25.05 KG/M2 | WEIGHT: 175 LBS | DIASTOLIC BLOOD PRESSURE: 67 MMHG | HEART RATE: 91 BPM | SYSTOLIC BLOOD PRESSURE: 105 MMHG

## 2021-11-16 DIAGNOSIS — R50.9 FEVER, UNSPECIFIED FEVER CAUSE: ICD-10-CM

## 2021-11-16 DIAGNOSIS — K80.20 GALLSTONES: ICD-10-CM

## 2021-11-16 LAB
ALBUMIN SERPL-MCNC: 2.7 G/DL (ref 3.4–5)
ALBUMIN UR-MCNC: 70 MG/DL
ALP SERPL-CCNC: 60 U/L (ref 40–150)
ALT SERPL W P-5'-P-CCNC: 31 U/L (ref 0–70)
ANION GAP SERPL CALCULATED.3IONS-SCNC: 9 MMOL/L (ref 3–14)
APPEARANCE UR: CLEAR
AST SERPL W P-5'-P-CCNC: 31 U/L (ref 0–45)
BACTERIA #/AREA URNS HPF: ABNORMAL /HPF
BASOPHILS # BLD MANUAL: 0 10E3/UL (ref 0–0.2)
BASOPHILS NFR BLD MANUAL: 0 %
BILIRUB SERPL-MCNC: 0.5 MG/DL (ref 0.2–1.3)
BILIRUB UR QL STRIP: NEGATIVE
BUN SERPL-MCNC: 16 MG/DL (ref 7–30)
CALCIUM SERPL-MCNC: 8.1 MG/DL (ref 8.5–10.1)
CHLORIDE BLD-SCNC: 106 MMOL/L (ref 94–109)
CO2 SERPL-SCNC: 24 MMOL/L (ref 20–32)
COLOR UR AUTO: YELLOW
CREAT SERPL-MCNC: 1.15 MG/DL (ref 0.66–1.25)
EOSINOPHIL # BLD MANUAL: 0 10E3/UL (ref 0–0.7)
EOSINOPHIL NFR BLD MANUAL: 0 %
ERYTHROCYTE [DISTWIDTH] IN BLOOD BY AUTOMATED COUNT: 14 % (ref 10–15)
FLUAV RNA SPEC QL NAA+PROBE: NEGATIVE
FLUBV RNA RESP QL NAA+PROBE: NEGATIVE
GFR SERPL CREATININE-BSD FRML MDRD: 69 ML/MIN/1.73M2
GLUCOSE BLD-MCNC: 115 MG/DL (ref 70–99)
GLUCOSE UR STRIP-MCNC: NEGATIVE MG/DL
HCT VFR BLD AUTO: 40.3 % (ref 40–53)
HGB BLD-MCNC: 13 G/DL (ref 13.3–17.7)
HGB UR QL STRIP: ABNORMAL
KETONES UR STRIP-MCNC: 20 MG/DL
LACTATE SERPL-SCNC: 1.6 MMOL/L (ref 0.7–2)
LEUKOCYTE ESTERASE UR QL STRIP: NEGATIVE
LIPASE SERPL-CCNC: 116 U/L (ref 73–393)
LYMPHOCYTES # BLD MANUAL: 0.4 10E3/UL (ref 0.8–5.3)
LYMPHOCYTES NFR BLD MANUAL: 12 %
MCH RBC QN AUTO: 25.8 PG (ref 26.5–33)
MCHC RBC AUTO-ENTMCNC: 32.3 G/DL (ref 31.5–36.5)
MCV RBC AUTO: 80 FL (ref 78–100)
MONOCYTES # BLD MANUAL: 0.1 10E3/UL (ref 0–1.3)
MONOCYTES NFR BLD MANUAL: 3 %
MUCOUS THREADS #/AREA URNS LPF: PRESENT /LPF
NEUTROPHILS # BLD MANUAL: 2.9 10E3/UL (ref 1.6–8.3)
NEUTROPHILS NFR BLD MANUAL: 85 %
NITRATE UR QL: NEGATIVE
PH UR STRIP: 6 [PH] (ref 5–7)
PLAT MORPH BLD: ABNORMAL
PLATELET # BLD AUTO: 85 10E3/UL (ref 150–450)
POTASSIUM BLD-SCNC: 3.4 MMOL/L (ref 3.4–5.3)
PROT SERPL-MCNC: 6.2 G/DL (ref 6.8–8.8)
RBC # BLD AUTO: 5.04 10E6/UL (ref 4.4–5.9)
RBC MORPH BLD: ABNORMAL
RBC URINE: 3 /HPF
SARS-COV-2 RNA RESP QL NAA+PROBE: NEGATIVE
SODIUM SERPL-SCNC: 139 MMOL/L (ref 133–144)
SP GR UR STRIP: 1.03 (ref 1–1.03)
UROBILINOGEN UR STRIP-MCNC: NORMAL MG/DL
WBC # BLD AUTO: 3.4 10E3/UL (ref 4–11)
WBC URINE: 3 /HPF

## 2021-11-16 PROCEDURE — 82040 ASSAY OF SERUM ALBUMIN: CPT | Performed by: EMERGENCY MEDICINE

## 2021-11-16 PROCEDURE — C9803 HOPD COVID-19 SPEC COLLECT: HCPCS

## 2021-11-16 PROCEDURE — 96361 HYDRATE IV INFUSION ADD-ON: CPT

## 2021-11-16 PROCEDURE — 87636 SARSCOV2 & INF A&B AMP PRB: CPT | Performed by: EMERGENCY MEDICINE

## 2021-11-16 PROCEDURE — 99285 EMERGENCY DEPT VISIT HI MDM: CPT | Mod: 25

## 2021-11-16 PROCEDURE — 250N000011 HC RX IP 250 OP 636: Performed by: EMERGENCY MEDICINE

## 2021-11-16 PROCEDURE — 81001 URINALYSIS AUTO W/SCOPE: CPT | Performed by: EMERGENCY MEDICINE

## 2021-11-16 PROCEDURE — 258N000003 HC RX IP 258 OP 636: Performed by: EMERGENCY MEDICINE

## 2021-11-16 PROCEDURE — 76705 ECHO EXAM OF ABDOMEN: CPT

## 2021-11-16 PROCEDURE — 83690 ASSAY OF LIPASE: CPT | Performed by: EMERGENCY MEDICINE

## 2021-11-16 PROCEDURE — 250N000013 HC RX MED GY IP 250 OP 250 PS 637: Performed by: EMERGENCY MEDICINE

## 2021-11-16 PROCEDURE — 87040 BLOOD CULTURE FOR BACTERIA: CPT | Performed by: EMERGENCY MEDICINE

## 2021-11-16 PROCEDURE — 36415 COLL VENOUS BLD VENIPUNCTURE: CPT | Performed by: EMERGENCY MEDICINE

## 2021-11-16 PROCEDURE — 83605 ASSAY OF LACTIC ACID: CPT | Performed by: EMERGENCY MEDICINE

## 2021-11-16 PROCEDURE — 85027 COMPLETE CBC AUTOMATED: CPT | Performed by: EMERGENCY MEDICINE

## 2021-11-16 PROCEDURE — 96374 THER/PROPH/DIAG INJ IV PUSH: CPT

## 2021-11-16 PROCEDURE — 96375 TX/PRO/DX INJ NEW DRUG ADDON: CPT

## 2021-11-16 RX ORDER — ONDANSETRON 2 MG/ML
4 INJECTION INTRAMUSCULAR; INTRAVENOUS ONCE
Status: COMPLETED | OUTPATIENT
Start: 2021-11-16 | End: 2021-11-16

## 2021-11-16 RX ORDER — DOXYCYCLINE 100 MG/1
100 CAPSULE ORAL 2 TIMES DAILY
Qty: 20 CAPSULE | Refills: 0 | Status: ON HOLD | OUTPATIENT
Start: 2021-11-16 | End: 2021-12-13

## 2021-11-16 RX ORDER — ACETAMINOPHEN 500 MG
1000 TABLET ORAL ONCE
Status: COMPLETED | OUTPATIENT
Start: 2021-11-16 | End: 2021-11-16

## 2021-11-16 RX ORDER — IMATINIB MESYLATE 400 MG/1
400 TABLET, FILM COATED ORAL DAILY
COMMUNITY

## 2021-11-16 RX ORDER — DOXYCYCLINE 100 MG/1
100 CAPSULE ORAL 2 TIMES DAILY
Qty: 20 CAPSULE | Refills: 0 | Status: SHIPPED | OUTPATIENT
Start: 2021-11-16 | End: 2021-11-16

## 2021-11-16 RX ORDER — HYDROMORPHONE HYDROCHLORIDE 1 MG/ML
0.5 INJECTION, SOLUTION INTRAMUSCULAR; INTRAVENOUS; SUBCUTANEOUS
Status: DISCONTINUED | OUTPATIENT
Start: 2021-11-16 | End: 2021-11-16 | Stop reason: HOSPADM

## 2021-11-16 RX ORDER — ESCITALOPRAM OXALATE 10 MG/1
10 TABLET ORAL DAILY
Status: ON HOLD | COMMUNITY
End: 2021-12-13

## 2021-11-16 RX ADMIN — SODIUM CHLORIDE 1000 ML: 9 INJECTION, SOLUTION INTRAVENOUS at 10:19

## 2021-11-16 RX ADMIN — ACETAMINOPHEN 1000 MG: 500 TABLET, FILM COATED ORAL at 10:22

## 2021-11-16 RX ADMIN — ONDANSETRON 4 MG: 2 INJECTION INTRAMUSCULAR; INTRAVENOUS at 10:23

## 2021-11-16 RX ADMIN — HYDROMORPHONE HYDROCHLORIDE 0.5 MG: 1 INJECTION, SOLUTION INTRAMUSCULAR; INTRAVENOUS; SUBCUTANEOUS at 10:25

## 2021-11-16 RX ADMIN — SODIUM CHLORIDE 1000 ML: 9 INJECTION, SOLUTION INTRAVENOUS at 12:00

## 2021-11-16 ASSESSMENT — ENCOUNTER SYMPTOMS
CHILLS: 1
FEVER: 1
ABDOMINAL PAIN: 1
DIAPHORESIS: 1
SHORTNESS OF BREATH: 0
COUGH: 1

## 2021-11-16 ASSESSMENT — MIFFLIN-ST. JEOR: SCORE: 1615.04

## 2021-11-16 NOTE — ED PROVIDER NOTES
History   Chief Complaint:  Fever       HPI   Makenna Abbott is a 61 year old male with history of Gilbert's syndrome and chronic myelocytic leukemia who presents with a fever and chills. The patient reports onset last week. He also endorses some upper right-sided abdominal pain that is made worse when eating. He says the pain was an 8 or 9 last night, and he vomited this morning which brought the pain down to a 4 or 5. No shortness of breath. Some cough. He states he had a chest XR yesterday that showed an air bubble in his lungs (see workup below). He had a Covid-19 test yesterday that is still in process. The wife reports that the chills are so bad that he has been shaking and gets diaphoretic.    Workup from 11/15:  XR Chest 2 Views:  Normal cardiomediastinal silhouette and pulmonary vasculature. There is no pulmonary edema. Probable minimal atelectasis within the right costophrenic angle with otherwise no focal pulmonary opacities. No effusion.  As read by Radiology.    Review of Systems   Constitutional: Positive for chills, diaphoresis and fever.   Respiratory: Positive for cough. Negative for shortness of breath.    Gastrointestinal: Positive for abdominal pain (upper right side).   All other systems reviewed and are negative.        Allergies:  The patient has no known allergies.     Medications:  Lexapro  Gleevec  Zanaflex  Zofran    Past Medical History:     Acute midline low back pain with left-sided sciatica  Anxiety  Depression  BPH with elevated pSA  GERD  Gilbert's syndrome  Chronic myelocytic leukemia    Past Surgical History:    The patient denies past surgical history.      Family History:    The patient denies past family history.     Social History:  The patient presents his wife. He is a never smoker.    Physical Exam     Patient Vitals for the past 24 hrs:   BP Temp Temp src Pulse Resp SpO2 Height Weight   11/16/21 1200 105/67 100.2  F (37.9  C) Oral 91 -- 96 % -- --   11/16/21 1130 99/45  "-- -- 95 -- 95 % -- --   11/16/21 1100 132/77 -- -- 100 -- 94 % -- --   11/16/21 1005 120/82 (!) 103.2  F (39.6  C) Oral 107 20 99 % 1.778 m (5' 10\") 79.4 kg (175 lb)       Physical Exam  General/Appearance: appears stated age, well-groomed, appears to not feel well, warm to the touch  Eyes: EOMI, no scleral injection, no icterus  ENT: MMM  Neck: supple, nl ROM, no stiffness  Cardiovascular: tachy but regular, nl S1S2, no m/r/g, 2+ pulses in all 4 extremities, cap refill <2sec  Respiratory: CTAB, good air movement throughout, no wheezes/rhonchi/rales, no increased WOB, no retractions  Back: no lesions  GI: abd soft, non-distended, lateral RUQ ttp,  no HSM, no rebound, no guarding, nl BS  MSK: CESAR, good tone, no bony abnormality  Skin: warm and well-perfused, no rash, no edema, no ecchymosis, nl turgor  Neuro: GCS 15, alert and oriented, no gross focal neuro deficits  Psych: interacts appropriately  Heme: no petechia, no purpura, no active bleeding    Emergency Department Course     Imaging:  US Abdomen Limited:  1. Cholelithiasis. Negative sonographic Hayes sign. No biliary dilatation.   2. Fatty liver. Enlarged liver.  As read by Radiology.    Laboratory:  CBC: WBC 3.4 (L), HGB 13.0 (L), PLT 85 (L)     CMP: Calcium 8.1 (L), Glucose 115 (H), Protein Total 6.2 (L), Albumin 2.7 (L), o/w WNL (Creatinine 1.15)     UA with microscopic: Ketones 20 (A), Blood trace (A), Protein Albumin 70 (A), Bacteria few (A), Mucus present (A), o/w WNL     Lactic acid (result time 1027) 1.6     Lipase: 116    Symptomatic Covid-19 PCR: negative    Procedures  None    Emergency Department Course:  Reviewed:  I reviewed nursing notes, vitals, past medical history and Care Everywhere    Assessments:  0950 I obtained history and examined the patient as noted above.   1220 I rechecked the patient and explained findings.     Interventions:  1019 NS 1L IV Bolus  1022 Tylenol, 1000 mg, PO  1023 Zofran, 4 mg, IV  1025 Dilaudid, 0.5 mg, " IV  1200 NS 1L IV Bolus    Disposition:  The patient was discharged to home.     Impression & Plan     Medical Decision Making:  This patient is a pleasant 61-year-old male on immunosuppressants for CML who presents with fever and some right upper lateral abdominal pain.  As far as the pain goes I suspect this is secondary to cholelithiasis.  Ultrasound was positive.  Labs, however, were unremarkable for elevated bilirubin or LFTs, no elevated white count, and ultrasound was unremarkable for any identifiers of inflammation or infection.  I have recommended that he make an appointment with general surgery as an outpatient just to discuss risks and benefits of cholecystectomy.  In regards to the fever, at this point in time is not totally clear what the causes.  Given his right lateral pain I considered pyelonephritis however urinalysis really is unremarkable.  He has no abdominal pain other than a little bit of tenderness over his gallbladder so I doubt anything like appendicitis or diverticulitis.  He had a chest x-ray yesterday which was negative and is not having increased shortness of breath or cough.  He is got no headache, nuchal rigidity, and he is identifiers of meningitis.  His Covid test today was negative.  Of importance his lactate also was normal.  I did send off blood cultures and these are pending.  One possible explanation would be [place] spotted fever as he does describe a nonspecific rash recently and just was in [place] less than a month ago.  Him and I and his wife discussed risks and benefits, pros and cons, of starting doxycycline and ultimately have opted to start it.  They are understanding that this may all be viral in nature and therefore the antibiotics may not do anything.  They also understand there is risks of diarrhea and C. difficile by starting him.  He will be written for a prescription.  Otherwise at this point in time as he is hemodynamically stable I think it is  reasonable to continue supportive care on an outpatient basis.      Diagnosis:    ICD-10-CM    1. Fever, unspecified fever cause  R50.9    2. Gallstones  K80.20        Discharge Medications:  Discharge Medication List as of 11/16/2021 12:44 PM          Scribe Disclosure:  Rosemary YOUNG, am serving as a scribe at 9:47 AM on 11/16/2021 to document services personally performed by Kusum Adkins MD based on my observations and the provider's statements to me.      Kusum Adkins MD  11/16/21 [id number]

## 2021-11-19 ENCOUNTER — OFFICE VISIT (OUTPATIENT)
Dept: SURGERY | Facility: CLINIC | Age: 61
End: 2021-11-19
Payer: COMMERCIAL

## 2021-11-19 VITALS
HEIGHT: 70 IN | DIASTOLIC BLOOD PRESSURE: 67 MMHG | WEIGHT: 180 LBS | BODY MASS INDEX: 25.77 KG/M2 | HEART RATE: 61 BPM | SYSTOLIC BLOOD PRESSURE: 105 MMHG | RESPIRATION RATE: 16 BRPM | OXYGEN SATURATION: 99 %

## 2021-11-19 DIAGNOSIS — K80.20 SYMPTOMATIC CHOLELITHIASIS: Primary | ICD-10-CM

## 2021-11-19 DIAGNOSIS — Z11.59 ENCOUNTER FOR SCREENING FOR OTHER VIRAL DISEASES: ICD-10-CM

## 2021-11-19 PROCEDURE — 99204 OFFICE O/P NEW MOD 45 MIN: CPT | Performed by: SURGERY

## 2021-11-19 ASSESSMENT — MIFFLIN-ST. JEOR: SCORE: 1637.72

## 2021-11-19 NOTE — PROGRESS NOTES
[facility] Consultation    CHIEF COMPLAINT:  Chief Complaint   Patient presents with     Consult For     Gallstones       HISTORY OF PRESENT ILLNESS:  Makenna Abbott is a 61 year old male who is seen in consultation for evaluation of gallstones.  The patient was recently seen in the emergency department on 11/16/2021 with significant fevers and chills.  He reports his temperature was as high as high as 103 Fahrenheit.  He was also having some pain in the right upper quadrant that radiated to his back.  His symptoms are worse with oral intake.  He also had nausea and vomiting.  Right upper quadrant ultrasound was completed and this demonstrated cholelithiasis.  LFTs were within normal limits.  The patient was placed on a regimen of antibiotics to cover for both Lyme's disease and [place] spotted fever.  Patient reports that he has now been greater than 24 hours fever free utilizing both Tylenol and Advil.  He does describe a several month to 1 year history of fatty food intolerance.  Family history is significant for gallbladder disease in the patient's mother and grandfather.  The patient has not undergone any previous abdominal surgical procedures.  Since his recent episode, he does describe some persistent abdominal pain and nausea.    REVIEW OF SYSTEMS:  Constitutional: Fever/chills  Eyes: No blurred or double vision  HENT: Denies headaches, No rhinorrhea, No sore throat  Respiratory: Cough  Cardiovascular: Denies chest pain or palpitations  Gastrointestinal: Abdominal pain and nausea  Genitourinary: No hematuria or dysuria  Musculoskeletal:  myalgias  Neurologic: No numbness or tingling  Integumentary: Abdominal rash    Past medical history:  -CML (in remission with use of chronic Gleevec)    No past surgical history on file.    Family history:  -Father with liver cancer and prostate cancer  Grandfather with prostate cancer    Social history:  -Patient reports intermittent tobacco  "use.  Occasional alcohol use.    Patient Active Problem List   Diagnosis   (none) - all problems resolved or deleted       No Known Allergies    Current Outpatient Medications   Medication Sig Dispense Refill     doxycycline hyclate (VIBRAMYCIN) 100 MG capsule Take 1 capsule (100 mg) by mouth 2 times daily 20 capsule 0     escitalopram (LEXAPRO) 10 MG tablet Take 10 mg by mouth daily       imatinib (GLEEVEC) 400 MG tablet Take 400 mg by mouth daily         Vitals: /67   Pulse 61   Resp 16   Ht 1.778 m (5' 10\")   Wt 81.6 kg (180 lb)   SpO2 99%   BMI 25.83 kg/m    BMI= Body mass index is 25.83 kg/m .    EXAM:  General: Vital signs reviewed, in no apparent distress  Eyes: Anicteric  HENT: Normocephalic, atraumatic, trachea midline   Respiratory: Breathing nonlabored  Cardiovascular: Regular rate and rhythm  GI: Abdomen soft, nondistended, focal tenderness with palpation over the right upper quadrant of the abdomen  Musculoskeletal: No gross deformities  Neurologic: Grossly nonfocal exam  Psychiatric: Normal mood, affect and insight  Integumentary: Warm and dry    All labs and imaging personally reviewed and significant for:   US ABDOMEN LIMITED 11/16/2021 12:04 PM     CLINICAL HISTORY: RUQ pain, fever.     TECHNIQUE: Limited abdominal ultrasound.     COMPARISON: None.     FINDINGS:     GALLBLADDER: Negative sonographic Hayes sign. Mobile gallstone  identified. No gallbladder wall thickening.     BILE DUCTS: There is no biliary dilatation. The common duct measures  4mm.     LIVER: Fatty liver. No focal mass identified. Somewhat heterogeneous  echotexture. Liver is 16.8 cm. Limited visualization of the liver  related to overlying acoustic shadowing.     RIGHT KIDNEY: No hydronephrosis.     PANCREAS: The visualized portions of the pancreas are normal.     No ascites.                                                                      IMPRESSION:  1.  Cholelithiasis. Negative sonographic Hayes sign. No " biliary  dilatation.  2.  Fatty liver. Enlarged liver.     Lab Results   Component Value Date    AST 31 11/16/2021     Lab Results   Component Value Date    ALT 31 11/16/2021     No results found for: BILICONJ   Lab Results   Component Value Date    BILITOTAL 0.5 11/16/2021     Lab Results   Component Value Date    ALBUMIN 2.7 11/16/2021     Lab Results   Component Value Date    PROTTOTAL 6.2 11/16/2021      Lab Results   Component Value Date    ALKPHOS 60 11/16/2021         ASSESSMENT:  Makenna Abbott is a 61 year old who presents with symptomatic cholelithiasis.  Significant pertinent co-morbidities include: CML, on chronic Gleevec.       PLAN:  Following a thorough discussion with the patient, the decision was made to proceed to the operating room for minimally invasive cholecystectomy.  The risks and benefits of the procedure were discussed with the patient.  Surgery will be scheduled at the patient's earliest convenience.    It was my pleasure to participate in the care of Makenna Abbott in clinic today. Thank you for this consultation.         Regina Armstrong MD    Please route or send letter to:  Primary Care Provider (PCP) and Referring Provider

## 2021-11-19 NOTE — LETTER
November 23, 2021          [facility]      RE:   Makenna Abbott 1960      Dear Colleague,    Thank you for referring your patient, Makenna Abbott, to [facility] at [facility] [place]. Please see a copy of my visit note below.    HISTORY OF PRESENT ILLNESS:  Makenna Abbott is a 61 year old male who is seen in consultation for evaluation of gallstones.  The patient was recently seen in the emergency department on 11/16/2021 with significant fevers and chills.  He reports his temperature was as high as high as 103 Fahrenheit.  He was also having some pain in the right upper quadrant that radiated to his back.  His symptoms are worse with oral intake.  He also had nausea and vomiting.  Right upper quadrant ultrasound was completed and this demonstrated cholelithiasis.  LFTs were within normal limits.  The patient was placed on a regimen of antibiotics to cover for both Lyme's disease and [place] spotted fever.  Patient reports that he has now been greater than 24 hours fever free utilizing both Tylenol and Advil.  He does describe a several month to 1 year history of fatty food intolerance.  Family history is significant for gallbladder disease in the patient's mother and grandfather.  The patient has not undergone any previous abdominal surgical procedures.  Since his recent episode, he does describe some persistent abdominal pain and nausea.     REVIEW OF SYSTEMS:  Constitutional: Fever/chills  Eyes: No blurred or double vision  HENT: Denies headaches, No rhinorrhea, No sore throat  Respiratory: Cough  Cardiovascular: Denies chest pain or palpitations  Gastrointestinal: Abdominal pain and nausea  Genitourinary: No hematuria or dysuria  Musculoskeletal:  myalgias  Neurologic: No numbness or tingling  Integumentary: Abdominal rash     Past medical history:  -CML (in remission with use of chronic Gleevec)     Past Surgical History   No past surgical history on file.        Family  "history:  -Father with liver cancer and prostate cancer  Grandfather with prostate cancer     Social history:  -Patient reports intermittent tobacco use.  Occasional alcohol use.     Patient Active Problem List   Diagnosis   (none) - all problems resolved or deleted         No Known Allergies     Current Outpatient Prescriptions          Current Outpatient Medications   Medication Sig Dispense Refill     doxycycline hyclate (VIBRAMYCIN) 100 MG capsule Take 1 capsule (100 mg) by mouth 2 times daily 20 capsule 0     escitalopram (LEXAPRO) 10 MG tablet Take 10 mg by mouth daily         imatinib (GLEEVEC) 400 MG tablet Take 400 mg by mouth daily                Vitals: /67   Pulse 61   Resp 16   Ht 1.778 m (5' 10\")   Wt 81.6 kg (180 lb)   SpO2 99%   BMI 25.83 kg/m    BMI= Body mass index is 25.83 kg/m .     EXAM:  General: Vital signs reviewed, in no apparent distress  Eyes: Anicteric  HENT: Normocephalic, atraumatic, trachea midline   Respiratory: Breathing nonlabored  Cardiovascular: Regular rate and rhythm  GI: Abdomen soft, nondistended, focal tenderness with palpation over the right upper quadrant of the abdomen  Musculoskeletal: No gross deformities  Neurologic: Grossly nonfocal exam  Psychiatric: Normal mood, affect and insight  Integumentary: Warm and dry     All labs and imaging personally reviewed and significant for:   US ABDOMEN LIMITED 11/16/2021 12:04 PM     CLINICAL HISTORY: RUQ pain, fever.     TECHNIQUE: Limited abdominal ultrasound.     COMPARISON: None.     FINDINGS:     GALLBLADDER: Negative sonographic Hayes sign. Mobile gallstone  identified. No gallbladder wall thickening.     BILE DUCTS: There is no biliary dilatation. The common duct measures  4mm.     LIVER: Fatty liver. No focal mass identified. Somewhat heterogeneous  echotexture. Liver is 16.8 cm. Limited visualization of the liver  related to overlying acoustic shadowing.     RIGHT KIDNEY: No hydronephrosis.     PANCREAS: The " visualized portions of the pancreas are normal.     No ascites.                                                                      IMPRESSION:  1.  Cholelithiasis. Negative sonographic Hayes sign. No biliary  dilatation.  2.  Fatty liver. Enlarged liver.           Lab Results   Component Value Date     AST 31 11/16/2021            Lab Results   Component Value Date     ALT 31 11/16/2021      No results found for: BILICONJ         Lab Results   Component Value Date     BILITOTAL 0.5 11/16/2021      Lab Results   Component Value Date     ALBUMIN 2.7 11/16/2021            Lab Results   Component Value Date     PROTTOTAL 6.2 11/16/2021            Lab Results   Component Value Date     ALKPHOS 60 11/16/2021            ASSESSMENT:  Makenna Abbott is a 61 year old who presents with symptomatic cholelithiasis.  Significant pertinent co-morbidities include: CML, on chronic Gleevec.         PLAN:  Following a thorough discussion with the patient, the decision was made to proceed to the operating room for minimally invasive cholecystectomy.  The risks and benefits of the procedure were discussed with the patient.  Surgery will be scheduled at the patient's earliest convenience.           Again, thank you for allowing me to participate in the care of your patient.      Sincerely,      Regina Armstrong MD

## 2021-11-21 LAB
BACTERIA BLD CULT: NO GROWTH
BACTERIA BLD CULT: NO GROWTH

## 2021-11-23 ENCOUNTER — TELEPHONE (OUTPATIENT)
Dept: SURGERY | Facility: CLINIC | Age: 61
End: 2021-11-23
Payer: COMMERCIAL

## 2021-11-23 NOTE — TELEPHONE ENCOUNTER
Type of surgery: robot assisted laparoscopic cholecystectomy  Location of surgery: [facility]  Date and time of surgery: 12/13/21 12:55pm  Surgeon: Dr Armstrong  Pre-Op Appt Date: pt to schedule  Post-Op Appt Date: pt to schedule   Packet sent out: Yes  Pre-cert/Authorization completed:  Not Applicable  Date: 11/19/21

## 2021-12-09 ENCOUNTER — LAB (OUTPATIENT)
Dept: URGENT CARE | Facility: URGENT CARE | Age: 61
End: 2021-12-09
Payer: COMMERCIAL

## 2021-12-09 DIAGNOSIS — Z11.59 ENCOUNTER FOR SCREENING FOR OTHER VIRAL DISEASES: ICD-10-CM

## 2021-12-09 PROCEDURE — U0005 INFEC AGEN DETEC AMPLI PROBE: HCPCS

## 2021-12-09 PROCEDURE — U0003 INFECTIOUS AGENT DETECTION BY NUCLEIC ACID (DNA OR RNA); SEVERE ACUTE RESPIRATORY SYNDROME CORONAVIRUS 2 (SARS-COV-2) (CORONAVIRUS DISEASE [COVID-19]), AMPLIFIED PROBE TECHNIQUE, MAKING USE OF HIGH THROUGHPUT TECHNOLOGIES AS DESCRIBED BY CMS-2020-01-R: HCPCS

## 2021-12-10 LAB — SARS-COV-2 RNA RESP QL NAA+PROBE: NEGATIVE

## 2021-12-10 RX ORDER — IBUPROFEN 800 MG/1
800 TABLET, FILM COATED ORAL EVERY 8 HOURS PRN
COMMUNITY

## 2021-12-13 ENCOUNTER — HOSPITAL ENCOUNTER (OUTPATIENT)
Facility: CLINIC | Age: 61
Discharge: HOME | End: 2021-12-13
Attending: SURGERY | Admitting: SURGERY
Payer: COMMERCIAL

## 2021-12-13 ENCOUNTER — ANESTHESIA EVENT (OUTPATIENT)
Dept: SURGERY | Facility: CLINIC | Age: 61
End: 2021-12-13
Payer: COMMERCIAL

## 2021-12-13 ENCOUNTER — ANESTHESIA (OUTPATIENT)
Dept: SURGERY | Facility: CLINIC | Age: 61
End: 2021-12-13
Payer: COMMERCIAL

## 2021-12-13 ENCOUNTER — APPOINTMENT (OUTPATIENT)
Dept: SURGERY | Facility: PHYSICIAN GROUP | Age: 61
End: 2021-12-13
Payer: COMMERCIAL

## 2021-12-13 VITALS
OXYGEN SATURATION: 97 % | BODY MASS INDEX: 25.61 KG/M2 | DIASTOLIC BLOOD PRESSURE: 93 MMHG | WEIGHT: 178.9 LBS | TEMPERATURE: 97.6 F | HEIGHT: 70 IN | RESPIRATION RATE: 14 BRPM | HEART RATE: 70 BPM | SYSTOLIC BLOOD PRESSURE: 153 MMHG

## 2021-12-13 DIAGNOSIS — G89.18 POSTOPERATIVE PAIN: Primary | ICD-10-CM

## 2021-12-13 DIAGNOSIS — K80.20 SYMPTOMATIC CHOLELITHIASIS: ICD-10-CM

## 2021-12-13 PROCEDURE — 47562 LAPAROSCOPIC CHOLECYSTECTOMY: CPT | Mod: AS | Performed by: PHYSICIAN ASSISTANT

## 2021-12-13 PROCEDURE — 250N000011 HC RX IP 250 OP 636: Performed by: SURGERY

## 2021-12-13 PROCEDURE — 250N000009 HC RX 250: Performed by: NURSE ANESTHETIST, CERTIFIED REGISTERED

## 2021-12-13 PROCEDURE — 250N000025 HC SEVOFLURANE, PER MIN: Performed by: SURGERY

## 2021-12-13 PROCEDURE — 999N000141 HC STATISTIC PRE-PROCEDURE NURSING ASSESSMENT: Performed by: SURGERY

## 2021-12-13 PROCEDURE — 250N000011 HC RX IP 250 OP 636: Performed by: NURSE ANESTHETIST, CERTIFIED REGISTERED

## 2021-12-13 PROCEDURE — 250N000013 HC RX MED GY IP 250 OP 250 PS 637: Performed by: ANESTHESIOLOGY

## 2021-12-13 PROCEDURE — 710N000012 HC RECOVERY PHASE 2, PER MINUTE: Performed by: SURGERY

## 2021-12-13 PROCEDURE — 250N000009 HC RX 250: Performed by: REGISTERED NURSE

## 2021-12-13 PROCEDURE — 258N000003 HC RX IP 258 OP 636: Performed by: ANESTHESIOLOGY

## 2021-12-13 PROCEDURE — 88304 TISSUE EXAM BY PATHOLOGIST: CPT | Mod: TC | Performed by: SURGERY

## 2021-12-13 PROCEDURE — 47562 LAPAROSCOPIC CHOLECYSTECTOMY: CPT | Performed by: SURGERY

## 2021-12-13 PROCEDURE — 370N000017 HC ANESTHESIA TECHNICAL FEE, PER MIN: Performed by: SURGERY

## 2021-12-13 PROCEDURE — S2900 ROBOTIC SURGICAL SYSTEM: HCPCS | Mod: AS | Performed by: PHYSICIAN ASSISTANT

## 2021-12-13 PROCEDURE — 710N000009 HC RECOVERY PHASE 1, LEVEL 1, PER MIN: Performed by: SURGERY

## 2021-12-13 PROCEDURE — 250N000011 HC RX IP 250 OP 636: Performed by: REGISTERED NURSE

## 2021-12-13 PROCEDURE — 250N000011 HC RX IP 250 OP 636: Performed by: ANESTHESIOLOGY

## 2021-12-13 PROCEDURE — 360N000080 HC SURGERY LEVEL 7, PER MIN: Performed by: SURGERY

## 2021-12-13 PROCEDURE — S2900 ROBOTIC SURGICAL SYSTEM: HCPCS | Performed by: SURGERY

## 2021-12-13 PROCEDURE — 258N000003 HC RX IP 258 OP 636: Performed by: NURSE ANESTHETIST, CERTIFIED REGISTERED

## 2021-12-13 PROCEDURE — 272N000001 HC OR GENERAL SUPPLY STERILE: Performed by: SURGERY

## 2021-12-13 PROCEDURE — 250N000009 HC RX 250: Performed by: SURGERY

## 2021-12-13 RX ORDER — CEFAZOLIN SODIUM 2 G/100ML
2 INJECTION, SOLUTION INTRAVENOUS SEE ADMIN INSTRUCTIONS
Status: DISCONTINUED | OUTPATIENT
Start: 2021-12-13 | End: 2021-12-13 | Stop reason: HOSPADM

## 2021-12-13 RX ORDER — MEPERIDINE HYDROCHLORIDE 25 MG/ML
12.5 INJECTION INTRAMUSCULAR; INTRAVENOUS; SUBCUTANEOUS
Status: DISCONTINUED | OUTPATIENT
Start: 2021-12-13 | End: 2021-12-13 | Stop reason: HOSPADM

## 2021-12-13 RX ORDER — GLYCOPYRROLATE 0.2 MG/ML
INJECTION, SOLUTION INTRAMUSCULAR; INTRAVENOUS PRN
Status: DISCONTINUED | OUTPATIENT
Start: 2021-12-13 | End: 2021-12-13

## 2021-12-13 RX ORDER — FENTANYL CITRATE 0.05 MG/ML
25 INJECTION, SOLUTION INTRAMUSCULAR; INTRAVENOUS EVERY 5 MIN PRN
Status: DISCONTINUED | OUTPATIENT
Start: 2021-12-13 | End: 2021-12-13 | Stop reason: HOSPADM

## 2021-12-13 RX ORDER — HYDROMORPHONE HCL IN WATER/PF 6 MG/30 ML
0.2 PATIENT CONTROLLED ANALGESIA SYRINGE INTRAVENOUS EVERY 5 MIN PRN
Status: DISCONTINUED | OUTPATIENT
Start: 2021-12-13 | End: 2021-12-13 | Stop reason: HOSPADM

## 2021-12-13 RX ORDER — SODIUM CHLORIDE, SODIUM LACTATE, POTASSIUM CHLORIDE, CALCIUM CHLORIDE 600; 310; 30; 20 MG/100ML; MG/100ML; MG/100ML; MG/100ML
INJECTION, SOLUTION INTRAVENOUS CONTINUOUS
Status: DISCONTINUED | OUTPATIENT
Start: 2021-12-13 | End: 2021-12-13 | Stop reason: HOSPADM

## 2021-12-13 RX ORDER — LIDOCAINE HYDROCHLORIDE 20 MG/ML
INJECTION, SOLUTION INFILTRATION; PERINEURAL PRN
Status: DISCONTINUED | OUTPATIENT
Start: 2021-12-13 | End: 2021-12-13

## 2021-12-13 RX ORDER — NEOSTIGMINE METHYLSULFATE 1 MG/ML
VIAL (ML) INJECTION PRN
Status: DISCONTINUED | OUTPATIENT
Start: 2021-12-13 | End: 2021-12-13

## 2021-12-13 RX ORDER — PROPOFOL 10 MG/ML
INJECTION, EMULSION INTRAVENOUS PRN
Status: DISCONTINUED | OUTPATIENT
Start: 2021-12-13 | End: 2021-12-13

## 2021-12-13 RX ORDER — OXYCODONE HYDROCHLORIDE 5 MG/1
5 TABLET ORAL EVERY 4 HOURS PRN
Status: DISCONTINUED | OUTPATIENT
Start: 2021-12-13 | End: 2021-12-13 | Stop reason: HOSPADM

## 2021-12-13 RX ORDER — FENTANYL CITRATE 0.05 MG/ML
25 INJECTION, SOLUTION INTRAMUSCULAR; INTRAVENOUS
Status: CANCELLED | OUTPATIENT
Start: 2021-12-13

## 2021-12-13 RX ORDER — ONDANSETRON 2 MG/ML
INJECTION INTRAMUSCULAR; INTRAVENOUS PRN
Status: DISCONTINUED | OUTPATIENT
Start: 2021-12-13 | End: 2021-12-13

## 2021-12-13 RX ORDER — HYDROCODONE BITARTRATE AND ACETAMINOPHEN 5; 325 MG/1; MG/1
1 TABLET ORAL
Status: DISCONTINUED | OUTPATIENT
Start: 2021-12-13 | End: 2021-12-13 | Stop reason: HOSPADM

## 2021-12-13 RX ORDER — HYDROCODONE BITARTRATE AND ACETAMINOPHEN 5; 325 MG/1; MG/1
1 TABLET ORAL EVERY 4 HOURS PRN
Qty: 12 TABLET | Refills: 0 | Status: SHIPPED | OUTPATIENT
Start: 2021-12-13

## 2021-12-13 RX ORDER — CEFAZOLIN SODIUM 2 G/100ML
2 INJECTION, SOLUTION INTRAVENOUS
Status: DISCONTINUED | OUTPATIENT
Start: 2021-12-13 | End: 2021-12-13 | Stop reason: HOSPADM

## 2021-12-13 RX ORDER — ONDANSETRON 4 MG/1
4 TABLET, ORALLY DISINTEGRATING ORAL EVERY 30 MIN PRN
Status: DISCONTINUED | OUTPATIENT
Start: 2021-12-13 | End: 2021-12-13 | Stop reason: HOSPADM

## 2021-12-13 RX ORDER — AMOXICILLIN 250 MG
1-2 CAPSULE ORAL 2 TIMES DAILY
Qty: 30 TABLET | Refills: 0 | Status: SHIPPED | OUTPATIENT
Start: 2021-12-13

## 2021-12-13 RX ORDER — FENTANYL CITRATE 50 UG/ML
INJECTION, SOLUTION INTRAMUSCULAR; INTRAVENOUS PRN
Status: DISCONTINUED | OUTPATIENT
Start: 2021-12-13 | End: 2021-12-13

## 2021-12-13 RX ORDER — DEXAMETHASONE SODIUM PHOSPHATE 4 MG/ML
INJECTION, SOLUTION INTRA-ARTICULAR; INTRALESIONAL; INTRAMUSCULAR; INTRAVENOUS; SOFT TISSUE PRN
Status: DISCONTINUED | OUTPATIENT
Start: 2021-12-13 | End: 2021-12-13

## 2021-12-13 RX ORDER — ONDANSETRON 2 MG/ML
4 INJECTION INTRAMUSCULAR; INTRAVENOUS EVERY 30 MIN PRN
Status: DISCONTINUED | OUTPATIENT
Start: 2021-12-13 | End: 2021-12-13 | Stop reason: HOSPADM

## 2021-12-13 RX ORDER — BUPIVACAINE HYDROCHLORIDE 5 MG/ML
INJECTION, SOLUTION PERINEURAL PRN
Status: DISCONTINUED | OUTPATIENT
Start: 2021-12-13 | End: 2021-12-13 | Stop reason: HOSPADM

## 2021-12-13 RX ORDER — HYDROXYZINE HYDROCHLORIDE 50 MG/ML
25 INJECTION, SOLUTION INTRAMUSCULAR EVERY 6 HOURS PRN
Status: DISCONTINUED | OUTPATIENT
Start: 2021-12-13 | End: 2021-12-13 | Stop reason: HOSPADM

## 2021-12-13 RX ADMIN — HYDROXYZINE HYDROCHLORIDE 25 MG: 50 INJECTION, SOLUTION INTRAMUSCULAR at 15:19

## 2021-12-13 RX ADMIN — SODIUM CHLORIDE, POTASSIUM CHLORIDE, SODIUM LACTATE AND CALCIUM CHLORIDE: 600; 310; 30; 20 INJECTION, SOLUTION INTRAVENOUS at 13:36

## 2021-12-13 RX ADMIN — ONDANSETRON 4 MG: 2 INJECTION INTRAMUSCULAR; INTRAVENOUS at 14:02

## 2021-12-13 RX ADMIN — ONDANSETRON 4 MG: 2 INJECTION INTRAMUSCULAR; INTRAVENOUS at 14:41

## 2021-12-13 RX ADMIN — NEOSTIGMINE METHYLSULFATE 4 MG: 1 INJECTION, SOLUTION INTRAVENOUS at 14:06

## 2021-12-13 RX ADMIN — HYDROMORPHONE HYDROCHLORIDE 0.2 MG: 0.2 INJECTION, SOLUTION INTRAMUSCULAR; INTRAVENOUS; SUBCUTANEOUS at 15:03

## 2021-12-13 RX ADMIN — CEFAZOLIN SODIUM 2 G: 2 INJECTION, SOLUTION INTRAVENOUS at 12:39

## 2021-12-13 RX ADMIN — PHENYLEPHRINE HYDROCHLORIDE 100 MCG: 10 INJECTION INTRAVENOUS at 13:29

## 2021-12-13 RX ADMIN — DEXAMETHASONE SODIUM PHOSPHATE 4 MG: 4 INJECTION, SOLUTION INTRA-ARTICULAR; INTRALESIONAL; INTRAMUSCULAR; INTRAVENOUS; SOFT TISSUE at 13:06

## 2021-12-13 RX ADMIN — FENTANYL CITRATE 50 MCG: 50 INJECTION, SOLUTION INTRAMUSCULAR; INTRAVENOUS at 13:49

## 2021-12-13 RX ADMIN — PROPOFOL 160 MG: 10 INJECTION, EMULSION INTRAVENOUS at 12:50

## 2021-12-13 RX ADMIN — HYDROMORPHONE HYDROCHLORIDE 0.2 MG: 0.2 INJECTION, SOLUTION INTRAMUSCULAR; INTRAVENOUS; SUBCUTANEOUS at 15:24

## 2021-12-13 RX ADMIN — ROCURONIUM BROMIDE 40 MG: 50 INJECTION, SOLUTION INTRAVENOUS at 12:50

## 2021-12-13 RX ADMIN — ROCURONIUM BROMIDE 10 MG: 50 INJECTION, SOLUTION INTRAVENOUS at 13:20

## 2021-12-13 RX ADMIN — PROPOFOL 30 MCG/KG/MIN: 10 INJECTION, EMULSION INTRAVENOUS at 12:58

## 2021-12-13 RX ADMIN — HYDROMORPHONE HYDROCHLORIDE 0.2 MG: 0.2 INJECTION, SOLUTION INTRAMUSCULAR; INTRAVENOUS; SUBCUTANEOUS at 15:08

## 2021-12-13 RX ADMIN — OXYCODONE HYDROCHLORIDE 5 MG: 5 TABLET ORAL at 15:49

## 2021-12-13 RX ADMIN — PHENYLEPHRINE HYDROCHLORIDE 100 MCG: 10 INJECTION INTRAVENOUS at 13:25

## 2021-12-13 RX ADMIN — PHENYLEPHRINE HYDROCHLORIDE 100 MCG: 10 INJECTION INTRAVENOUS at 13:40

## 2021-12-13 RX ADMIN — FENTANYL CITRATE 25 MCG: 0.05 INJECTION, SOLUTION INTRAMUSCULAR; INTRAVENOUS at 14:46

## 2021-12-13 RX ADMIN — FENTANYL CITRATE 50 MCG: 50 INJECTION, SOLUTION INTRAMUSCULAR; INTRAVENOUS at 12:50

## 2021-12-13 RX ADMIN — MIDAZOLAM 2 MG: 1 INJECTION INTRAMUSCULAR; INTRAVENOUS at 12:50

## 2021-12-13 RX ADMIN — SODIUM CHLORIDE, POTASSIUM CHLORIDE, SODIUM LACTATE AND CALCIUM CHLORIDE: 600; 310; 30; 20 INJECTION, SOLUTION INTRAVENOUS at 12:43

## 2021-12-13 RX ADMIN — GLYCOPYRROLATE 0.6 MG: 0.2 INJECTION, SOLUTION INTRAMUSCULAR; INTRAVENOUS at 14:06

## 2021-12-13 RX ADMIN — LIDOCAINE HYDROCHLORIDE 100 MG: 20 INJECTION, SOLUTION INFILTRATION; PERINEURAL at 12:50

## 2021-12-13 RX ADMIN — FENTANYL CITRATE 25 MCG: 0.05 INJECTION, SOLUTION INTRAMUSCULAR; INTRAVENOUS at 14:38

## 2021-12-13 RX ADMIN — PHENYLEPHRINE HYDROCHLORIDE 100 MCG: 10 INJECTION INTRAVENOUS at 13:27

## 2021-12-13 ASSESSMENT — LIFESTYLE VARIABLES: TOBACCO_USE: 1

## 2021-12-13 ASSESSMENT — MIFFLIN-ST. JEOR: SCORE: 1632.74

## 2021-12-13 ASSESSMENT — ENCOUNTER SYMPTOMS
SEIZURES: 0
ORTHOPNEA: 0
DYSRHYTHMIAS: 0

## 2021-12-13 NOTE — ANESTHESIA PROCEDURE NOTES
Airway       Patient location during procedure: OR       Procedure Start/Stop Times: 12/13/2021 12:57 PM  Staff -        CRNA: Lizette Young APRN CRNA       Performed By: CRNA  Consent for Airway        Urgency: elective  Indications and Patient Condition       Indications for airway management: ger-procedural       Induction type:intravenous       Mask difficulty assessment: 1 - vent by mask    Final Airway Details       Final airway type: endotracheal airway       Successful airway: ETT - single  Endotracheal Airway Details        ETT size (mm): 8.0       Cuffed: yes       Successful intubation technique: video laryngoscopy       VL Blade Size: Glidescope 4       Grade View of Cords: 1       Adjucts: stylet       Position: Right       Measured from: lips       Secured at (cm): 24       Bite block used: None    Post intubation assessment        Placement verified by: capnometry, equal breath sounds and chest rise        Number of attempts at approach: 1       Secured with: pink tape       Ease of procedure: easy       Dentition: Intact and Unchanged

## 2021-12-13 NOTE — DISCHARGE INSTRUCTIONS
[facility]  Discharge Instructions: Post-Operative Robotic  Cholecystectomy    ACTIVITY    Expect to feel tired after your surgery.  This will gradually resolve.      Take frequent, short walks and increase your activity gradually.      Avoid strenuous physical activity or heavy lifting greater than 15-20 lbs. for 2-3 weeks.  You may climb stairs.    You may drive without restrictions when you are not using any prescription pain medication and feel comfortable in a car.    You may return to work/school when you are comfortable without any prescription pain medication.    WOUND CARE    You may remove your outer dressing or Band-Aids and shower 48 hours after the surgery.  Pat your incisions dry and leave them open to air.  Re-apply dressing (Band-Aids or gauze/tape) as needed for comfort or drainage.    You have surgical glue on your incisions. Let this peel up and fall off on its own.    Do not soak your incisions in a tub or pool for 2 weeks.     Do not apply any lotions, creams, or ointments to your incisions.    A ridge under your incisions is normal and will gradually resolve.    DIET    Start with liquids, then gradually resume your regular diet as tolerated.  Avoid heavy, spicy, and greasy meals for 2-3 days.    Drink plenty of fluids to stay hydrated.    It is not uncommon to experience some loose stools or diarrhea after surgery.  This is your body's way of adapting to the bile which will slowly drain into your intestine.  A low fat diet may help with this.  This should improve over 1-2 months.    PAIN    Expect some tenderness and discomfort at the incision sites.  Use the prescribed pain medication at your discretion.  Expect gradual resolution of your pain over several days.    You may take ibuprofen with food (unless you have been told not to) or acetaminophen/Tylenol instead of or in addition to your prescribed pain medication.  If you are taking Norco, do not take any  additional acetaminophen/Tylenol.    Do not drink alcohol or drive while you are taking pain medications.    You may apply ice to your incisions in 20 minute intervals as needed for the next 48 hours.  After that time, consider switching to heat if you prefer.    EXPECTATIONS    Pain medications can cause constipation.  Limit use when possible.  Take over the counter stool softener/stimulant, such as Colace or Senna, 1-2 times a day with plenty of water.  You may take a mild over the counter laxative, such as Miralax or a suppository, as needed.  You may discontinue these medications once you are having regular bowel movements and/or are no longer taking your narcotic pain medication.      You may have shoulder or upper back discomfort due to the gas used in surgery.  This is temporary and should resolve in 48-72 hours.  Short, frequent walks may help with this.    If you are unable to urinate, or feel as though you are not emptying your bladder adequately, we recommend you call our office and/or seek care at an ER or Urgent Care facility if after hours.    FOLLOW UP    Our office will contact you in approximately 2 weeks to check on your progress and answer any questions you may have.  If you are doing well, you will not need to return for a follow up appointment.  If any concerns are identified over the phone, we will help you make an appointment to see a provider.     If you have not received a phone call, have any questions or concerns, or would like to be seen, please call us at [phone] and ask to speak with our nurse.  We are located at [address] [place] [address].    CALL OUR OFFICE -[phone] IF YOU HAVE:     Chills or fever above 101 F.    Increased redness, warmth, or drainage at your incisions.    Significant bleeding.    Pain not relieved by your pain medication or rest.    Increasing pain after the first 48 hours.    Any other concerns or questions.                             1 tablet OXYCODONE TAKEN ORALLY AT 4:00 p.m. TODAY.          **If you have concerns or questions about your procedure,    please contact Dr. Armstrong at  [phone]**                Same Day Surgery Discharge Instructions for  Sedation and General Anesthesia       It's not unusual to feel dizzy, light-headed or faint for up to 24 hours after surgery or while taking pain medication.  If you have these symptoms: sit for a few minutes before standing and have someone assist you when you get up to walk or use the bathroom.      You should rest and relax for the next 24 hours. We recommend you make arrangements to have an adult stay with you for at least 24 hours after your discharge.  Avoid hazardous and strenuous activity.      DO NOT DRIVE any vehicle or operate mechanical equipment for 24 hours following the end of your surgery.  Even though you may feel normal, your reactions may be affected by the medication you have received.      Do not drink alcoholic beverages for 24 hours following surgery.       Slowly progress to your regular diet as you feel able. It's not unusual to feel nauseated and/or vomit after receiving anesthesia.  If you develop these symptoms, drink clear liquids (apple juice, ginger ale, broth, 7-up, etc. ) until you feel better.  If your nausea and vomiting persists for 24 hours, please notify your surgeon.        All narcotic pain medications, along with inactivity and anesthesia, can cause constipation. Drinking plenty of liquids and increasing fiber intake will help.      For any questions of a medical nature, call your surgeon.      Do not make important decisions for 24 hours.      If you had general anesthesia, you may have a sore throat for a couple of days related to the breathing tube used during surgery.  You may use Cepacol lozenges to help with this discomfort.  If it worsens or if you develop a fever, contact your surgeon.       If you feel your pain is not well managed with  the pain medications prescribed by your surgeon, please contact your surgeon's office to let them know so they can address your concerns.       CoVid 19 Information    We want to give you information regarding Covid. Please consult your primary care provider with any questions you might have.     Patient who have symptoms (cough, fever, or shortness of breath), need to isolate for 7 days from when symptoms started OR 72 hours after fever resolves (without fever reducing medications) AND improvement of respiratory symptoms (whichever is longer).      Isolate yourself at home (in own room/own bathroom if possible)    Do Not allow any visitors    Do Not go to work or school    Do Not go to [employer],  centers, shopping, or other public places.    Do Not shake hands.    Avoid close and intimate contact with others (hugging, kissing).    Follow CDC recommendations for household cleaning of frequently touched services.     After the initial 7 days, continue to isolate yourself from household members as much as possible. To continue decrease the risk of community spread and exposure, you and any members of your household should limit activities in public for 14 days after starting home isolation.     You can reference the following CDC link for helpful home isolation/care tips:  https://www.cdc.gov/coronavirus/2019-ncov/downloads/10Things.pdf    Protect Others:    Cover Your Mouth and Nose with a mask, disposable tissue or wash cloth to avoid spreading germs to others.    Wash your hands and face frequently with soap and water    Call Your Primary Doctor If: Breathing difficulty develops or you become worse.    For more information about COVID19 and options for caring for yourself at home, please visit the CDC website at https://www.cdc.gov/coronavirus/2019-ncov/about/steps-when-sick.html  For more options for care at [facility], please visit our website at  [email or web address]

## 2021-12-13 NOTE — ANESTHESIA CARE TRANSFER NOTE
Patient: Makenna Abbott    Procedure: Procedure(s):  ROBOT-ASSISTED LAPAROSCOPIC CHOLECYSTECTOMY, WITHOUT CHOLANGIOGRAM       Diagnosis: Symptomatic cholelithiasis [K80.20]  Diagnosis Additional Information: No value filed.    Anesthesia Type:   General     Note:    Oropharynx: oropharynx clear of all foreign objects  Level of Consciousness: drowsy  Oxygen Supplementation: face mask  Level of Supplemental Oxygen (L/min / FiO2): 15  Independent Airway: airway patency satisfactory and stable  Dentition: dentition unchanged  Vital Signs Stable: post-procedure vital signs reviewed and stable  Report to RN Given: handoff report given  Patient transferred to: PACU  Comments: Spont. Resps, pt. Responding.  Extubated, sufficient air exchange. Oxygen mask placed with 10 L O2. To PACU VSS, Monitors on. Report to RN  Handoff Report: Identifed the Patient, Identified the Reponsible Provider, Reviewed the pertinent medical history, Discussed the surgical course, Reviewed Intra-OP anesthesia mangement and issues during anesthesia, Set expectations for post-procedure period and Allowed opportunity for questions and acknowledgement of understanding      Vitals:  Vitals Value Taken Time   BP     Temp     Pulse 89 12/13/21 1426   Resp 11 12/13/21 1426   SpO2 96 % 12/13/21 1426   Vitals shown include unvalidated device data.    Electronically Signed By: DOREEN Kong CRNA  December 13, 2021  2:26 PM

## 2021-12-13 NOTE — ANESTHESIA PREPROCEDURE EVALUATION
Anesthesia Pre-Procedure Evaluation    Patient: Makenna Abbott   MRN: [id number] : 1960        Preoperative Diagnosis: Symptomatic cholelithiasis [K80.20]    Procedure : Procedure(s):  ROBOT-ASSISTED LAPAROSCOPIC CHOLECYSTECTOMY, WITHOUT CHOLANGIOGRAM          Past Medical History:   Diagnosis Date     Acute midline low back pain with left-sided sciatica      Anxiety      BPH with elevated PSA      Calculus of gallbladder without cholecystitis with obstruction      CML (chronic myelocytic leukemia) (H)      Constipation      Depression      ED (erectile dysfunction)      Epididymitis      Gastroesophageal reflux disease      Gilbert's syndrome      Hip pain      Neck pain      Plantar fasciitis      Vitamin D deficiency       Past Surgical History:   Procedure Laterality Date     COLONOSCOPY       EYE SURGERY      LASIK     GENITOURINARY SURGERY      EXCISE LEFT VARICOCELE     GI SURGERY      RECTAL FISSURE      No Known Allergies   Social History     Tobacco Use     Smoking status: Never Smoker     Smokeless tobacco: Never Used   Substance Use Topics     Alcohol use: Yes      Wt Readings from Last 1 Encounters:   21 81.6 kg (180 lb)        Anesthesia Evaluation   Pt has had prior anesthetic.     No history of anesthetic complications       ROS/MED HX  ENT/Pulmonary:     (+) tobacco use,  (-) sleep apnea and recent URI   Neurologic:     (+) no peripheral neuropathy  (-) no seizures and migraines   Cardiovascular:    (-) hypertension, CHF, orthopnea/PND and arrhythmias   METS/Exercise Tolerance:     Hematologic:  - neg hematologic  ROS     Musculoskeletal: Comment: Chronic left shoulder pain  Left sided sciatica      GI/Hepatic: Comment: Fatty liver    (+) GERD, cholecystitis/cholelithiasis,     Renal/Genitourinary:     (+) BPH,     Endo:       Psychiatric/Substance Use:     (+) psychiatric history anxiety and depression     Infectious Disease: Comment: Fever chills 21  Concern for some tick  borne disease - treated with antibotics      Malignancy:   (+) Malignancy (CML - treated with Gleevek), History of Lymphoma/Leukemia.    Other:            Physical Exam    Airway        Mallampati: III   TM distance: > 3 FB   Neck ROM: full   Mouth opening: > 3 cm    Respiratory Devices and Support         Dental  no notable dental history         Cardiovascular   cardiovascular exam normal       Rhythm and rate: regular and normal     Pulmonary   pulmonary exam normal        breath sounds clear to auscultation           OUTSIDE LABS:  CBC:   Lab Results   Component Value Date    WBC 3.4 (L) 11/16/2021    WBC 9.7 08/09/2019    HGB 13.0 (L) 11/16/2021    HGB 14.1 08/09/2019    HCT 40.3 11/16/2021    HCT 42.3 08/09/2019    PLT 85 (L) 11/16/2021     08/09/2019     BMP:   Lab Results   Component Value Date     11/16/2021     04/01/2021    POTASSIUM 3.4 11/16/2021    POTASSIUM 4.4 04/01/2021    CHLORIDE 106 11/16/2021    CHLORIDE 107 04/01/2021    CO2 24 11/16/2021    CO2 29 04/01/2021    BUN 16 11/16/2021    BUN 18 04/01/2021    CR 1.15 11/16/2021    CR 1.00 04/01/2021     (H) 11/16/2021    GLC 99 04/01/2021     COAGS: No results found for: PTT, INR, FIBR  POC: No results found for: BGM, HCG, HCGS  HEPATIC:   Lab Results   Component Value Date    ALBUMIN 2.7 (L) 11/16/2021    PROTTOTAL 6.2 (L) 11/16/2021    ALT 31 11/16/2021    AST 31 11/16/2021    ALKPHOS 60 11/16/2021    BILITOTAL 0.5 11/16/2021     OTHER:   Lab Results   Component Value Date    LACT 1.6 11/16/2021    JUDY 8.1 (L) 11/16/2021    LIPASE 116 11/16/2021       Anesthesia Plan    ASA Status:  2   NPO Status:  NPO Appropriate    Anesthesia Type: General.     - Airway: ETT   Induction: Propofol.   Maintenance: Balanced.   Techniques and Equipment:     - Airway: Video-Laryngoscope         Consents    Anesthesia Plan(s) and associated risks, benefits, and realistic alternatives discussed. Questions answered and patient/representative(s)  expressed understanding.    - Discussed:     - Discussed with:  Patient, Spouse         Postoperative Care    Pain management: IV analgesics, Multi-modal analgesia.   PONV prophylaxis: Ondansetron (or other 5HT-3), Dexamethasone or Solumedrol     Comments:                Dylan Gonzalez MD

## 2021-12-13 NOTE — OP NOTE
Procedure Date: 12/13/21     STAFF SURGEON:  Regina Armstrong MD      ASSISTANT:  KORINA Tavares     PREOPERATIVE DIAGNOSIS:  Symptomatic cholelithiasis.      POSTOPERATIVE DIAGNOSIS:  Symptomatic cholelithiasis.      PROCEDURES PERFORMED:  Robotic cholecystectomy.      INDICATIONS:  Aiden is a 61-year-old male who presented with abdominal pain.  The patient was evaluated and right upper quadrant ultrasound demonstrated evidence of cholelithiasis.  LFT's were within normal limits.  Due to the persistence of the patient's symptoms, the decision was made for the patient to proceed to the operating room for robotic cholecystectomy.  The risks and benefits of the procedure were discussed with the patient and consent for the procedure was obtained.      ANESTHESIA:  General.      DESCRIPTION OF PROCEDURE:  The patient was brought to the preoperative area and preoperative antibiotics were administered.  The patient was then taken back to the operating room and placed in the supine position on the operating table.  A timeout was completed.  Anesthesia was induced and the patient was intubated.  The patient was secured to the operating table and her pressure points were padded.  The patient's abdomen was prepped and draped in a sterile fashion.        Following infiltration with local anesthetic, the 11 blade scalpel was used to make a small left upper quadrant incision.  Entrance into the abdomen was then gained under direct visualization using the 5 mm Visiport and the 5 mm laparoscope.  When the patient's abdomen had been safely entered, it was fully insufflated.  The laparoscope was then reinserted into the abdominal cavity and initial inspection revealed no evidence of injury at the port entry site.  Under direct visualization, 3 additional 8 mm robotic ports were placed extending in a line towards the patient's right mid abdomen.  The patient's left upper quadrant 5 mm port was then upsized to an 8 mm robotic  port.  The patient was then placed into the reverse Trendelenburg position with a slight left tilt.  The robot was then brought onto the field and docked.  The robotic tip-up grasper, robotic fenestrated bipolar grasper, and the robotic scissors were introduced into the abdominal cavity under direct visualization.  The tip-up grasper was used to grasp the dome of the gallbladder, which was then reflected cephalad.  The fenestrated grasper was used to grasp the infundibulum of the gallbladder.  The robotic scissors was then used to carefully dissect out the patient's cystic duct and cystic artery.  When the patient's cystic duct and cystic artery had been completely dissected out and the critical view had been obtained, the cystic duct was doubly clipped proximally, singly clipped distally and divided.  The cystic artery was clipped proximally and then divided utilizing the electrocautery on the robotic scissors.  The gallbladder was then carefully  free from its attachments to the liver bed utilizing the robotic scissors and electrocautery.  When the gallbladder had been completely dissected free, it was placed into an Endocatch bag and removed through the patient's left upper quadrant port site.  The patient's right upper quadrant was again surveyed.  There was no significant bleeding from the liver bed.  Hemostasis was ensured.  The right upper quadrant was irrigated with normal saline solution.        We then turned our attention to the patient's left upper quadrant port site.  The port was removed and the fascia reapproximated using the Jh-Katie fascial closure device.  The Jh-Katie fascial closure device was then utilized to place an 0 Vicryl suture to reapproximate the fascia at the left upper quadrant port site.  The remaining robotic equipment was removed from the abdominal cavity and the robot was undocked.  The patient's abdomen was allowed to desufflate fully.  The robotic ports  were removed and the port sites inspected and hemostasis was ensured.  The port sites were then reapproximated using 4-0 Monocryl subcuticular stitches.  The incisions were washed and dried and skin glue was applied.  Lap, needle and instrument counts were correct at the end of the procedure.  The patient tolerated the procedure well and was taken to the recovery area in stable condition.     Cass Ricks PA-C assisted in the above procedure. They provided assistance with pre-operative positioning, prepping, and draping of the patient. The assistant provided vital operative assistance with retraction using instruments thus providing the necessary exposure and visualization for the case, manipulation of tissues to achieve hemostasis, suction for visualization and assisted in closure of the wound. Post-operatively they assisted in transfer of the patient off the operative table and transition to the PACU physicians.       ESTIMATED BLOOD LOSS:  5 mL      FINDINGS:   Gallbladder with stones     COMPLICATIONS:  None.      SPECIMENS:  Gallbladder and contents.      DRAINS:  None.      CONDITION:  The patient will be discharged home directly from the postanesthesia care unit with instructions for postoperative cares and medications for pain management.         RAY PASCUAL MD

## 2021-12-13 NOTE — ANESTHESIA POSTPROCEDURE EVALUATION
Patient: Makenna Abbott    Procedure: Procedure(s):  ROBOT-ASSISTED LAPAROSCOPIC CHOLECYSTECTOMY, WITHOUT CHOLANGIOGRAM       Diagnosis:Symptomatic cholelithiasis [K80.20]  Diagnosis Additional Information: No value filed.    Anesthesia Type:  General    Note:  Disposition: Outpatient   Postop Pain Control: Uneventful            Sign Out: Well controlled pain   PONV: Yes            Sign Out: PONV/POV resolved with treatment   Neuro/Psych: Uneventful            Sign Out: Acceptable/Baseline neuro status   Airway/Respiratory: Uneventful            Sign Out: Acceptable/Baseline resp. status   CV/Hemodynamics: Uneventful            Sign Out: Acceptable CV status; No obvious hypovolemia; No obvious fluid overload   Other NRE: NONE   DID A NON-ROUTINE EVENT OCCUR? No           Last vitals:  Vitals Value Taken Time   /93 12/13/21 1600   Temp 36.4  C (97.6  F) 12/13/21 1600   Pulse 66 12/13/21 1606   Resp 8 12/13/21 1606   SpO2 94 % 12/13/21 1607   Vitals shown include unvalidated device data.    Electronically Signed By: Dylan Gonzalez MD  December 13, 2021  4:29 PM

## 2021-12-15 LAB
PATH REPORT.COMMENTS IMP SPEC: NORMAL
PATH REPORT.COMMENTS IMP SPEC: NORMAL
PATH REPORT.FINAL DX SPEC: NORMAL
PATH REPORT.GROSS SPEC: NORMAL
PATH REPORT.MICROSCOPIC SPEC OTHER STN: NORMAL
PATH REPORT.RELEVANT HX SPEC: NORMAL
PHOTO IMAGE: NORMAL

## 2021-12-15 PROCEDURE — 88304 TISSUE EXAM BY PATHOLOGIST: CPT | Mod: 26 | Performed by: PATHOLOGY

## 2021-12-27 ENCOUNTER — TELEPHONE (OUTPATIENT)
Facility: CLINIC | Age: 61
End: 2021-12-27
Payer: COMMERCIAL

## 2021-12-27 NOTE — CONFIDENTIAL NOTE
[facility]  Post op call note - ROBOTIC CHOLECYSTECTOMY  December 27, 2021       Makenna Abbott was called for an update regarding his recovery.  He underwent a robotic cholecystectomy by Dr. Armstrong on 12/13.  Today he tells me he is doing well and denies any complaints.  He currently does not need any pain medications.  He is eating a normal diet and his bowels are regular.  The patient states he is slowly resuming normal activity but has not gone back to soccer yet.  He states his wounds are healing well and the surgical glue is on.  He denies any erythema or drainage at his wounds.  The patient denies fever/chills, n/v/d, changes in BM, or wound concerns.     His pathology was discussed.  He was instructed to continue to keep wounds clean.  He was advised to advance his activity as tolerated with no heavy lifting > 20 lbs or strenuous exercise x 1-2 weeks.  He can start some light jogging this week to determine when he is physically able to resume soccer.  The patient states all of his questions were answered and he understands our discussion.  He agrees to follow up as needed and to call our office with any concerns.    Cass Ricks PA-C  [facility]  [phone]      Please route or send letter to:  Primary Care Provider (PCP)

## 2022-04-16 ENCOUNTER — HEALTH MAINTENANCE LETTER (OUTPATIENT)
Age: 62
End: 2022-04-16

## 2022-10-22 ENCOUNTER — HEALTH MAINTENANCE LETTER (OUTPATIENT)
Age: 62
End: 2022-10-22

## 2023-11-05 ENCOUNTER — HEALTH MAINTENANCE LETTER (OUTPATIENT)
Age: 63
End: 2023-11-05

## 2025-03-12 ENCOUNTER — HOSPITAL ENCOUNTER (EMERGENCY)
Facility: CLINIC | Age: 65
Discharge: HOME | End: 2025-03-13
Attending: EMERGENCY MEDICINE
Payer: COMMERCIAL

## 2025-03-12 DIAGNOSIS — R19.7 VOMITING AND DIARRHEA: ICD-10-CM

## 2025-03-12 DIAGNOSIS — R11.10 VOMITING AND DIARRHEA: ICD-10-CM

## 2025-03-12 DIAGNOSIS — E86.0 DEHYDRATION: ICD-10-CM

## 2025-03-12 LAB
ALBUMIN SERPL BCG-MCNC: 4.5 G/DL (ref 3.5–5.2)
ALP SERPL-CCNC: 74 U/L (ref 40–150)
ALT SERPL W P-5'-P-CCNC: 38 U/L (ref 0–70)
ANION GAP SERPL CALCULATED.3IONS-SCNC: 13 MMOL/L (ref 7–15)
AST SERPL W P-5'-P-CCNC: 27 U/L (ref 0–45)
BASOPHILS # BLD AUTO: 0 10E3/UL (ref 0–0.2)
BASOPHILS NFR BLD AUTO: 0 %
BILIRUB SERPL-MCNC: 0.5 MG/DL
BUN SERPL-MCNC: 19.1 MG/DL (ref 8–23)
CALCIUM SERPL-MCNC: 9.4 MG/DL (ref 8.8–10.4)
CHLORIDE SERPL-SCNC: 102 MMOL/L (ref 98–107)
CREAT SERPL-MCNC: 1.18 MG/DL (ref 0.67–1.17)
EGFRCR SERPLBLD CKD-EPI 2021: 70 ML/MIN/1.73M2
EOSINOPHIL # BLD AUTO: 0 10E3/UL (ref 0–0.7)
EOSINOPHIL NFR BLD AUTO: 0 %
ERYTHROCYTE [DISTWIDTH] IN BLOOD BY AUTOMATED COUNT: 13.6 % (ref 10–15)
GLUCOSE SERPL-MCNC: 122 MG/DL (ref 70–99)
HCO3 SERPL-SCNC: 25 MMOL/L (ref 22–29)
HCT VFR BLD AUTO: 49.1 % (ref 40–53)
HGB BLD-MCNC: 15.8 G/DL (ref 13.3–17.7)
HOLD SPECIMEN: NORMAL
HOLD SPECIMEN: NORMAL
IMM GRANULOCYTES # BLD: 0 10E3/UL
IMM GRANULOCYTES NFR BLD: 0 %
LIPASE SERPL-CCNC: 27 U/L (ref 13–60)
LYMPHOCYTES # BLD AUTO: 0.6 10E3/UL (ref 0.8–5.3)
LYMPHOCYTES NFR BLD AUTO: 6 %
MCH RBC QN AUTO: 25.1 PG (ref 26.5–33)
MCHC RBC AUTO-ENTMCNC: 32.2 G/DL (ref 31.5–36.5)
MCV RBC AUTO: 78 FL (ref 78–100)
MONOCYTES # BLD AUTO: 0.6 10E3/UL (ref 0–1.3)
MONOCYTES NFR BLD AUTO: 6 %
NEUTROPHILS # BLD AUTO: 9.1 10E3/UL (ref 1.6–8.3)
NEUTROPHILS NFR BLD AUTO: 88 %
NRBC # BLD AUTO: 0 10E3/UL
NRBC BLD AUTO-RTO: 0 /100
PLATELET # BLD AUTO: 270 10E3/UL (ref 150–450)
POTASSIUM SERPL-SCNC: 3.6 MMOL/L (ref 3.4–5.3)
PROT SERPL-MCNC: 7.7 G/DL (ref 6.4–8.3)
RBC # BLD AUTO: 6.29 10E6/UL (ref 4.4–5.9)
SODIUM SERPL-SCNC: 140 MMOL/L (ref 135–145)
WBC # BLD AUTO: 10.4 10E3/UL (ref 4–11)

## 2025-03-12 PROCEDURE — 83690 ASSAY OF LIPASE: CPT | Performed by: EMERGENCY MEDICINE

## 2025-03-12 PROCEDURE — 85041 AUTOMATED RBC COUNT: CPT | Performed by: EMERGENCY MEDICINE

## 2025-03-12 PROCEDURE — 99284 EMERGENCY DEPT VISIT MOD MDM: CPT | Mod: 25

## 2025-03-12 PROCEDURE — 96374 THER/PROPH/DIAG INJ IV PUSH: CPT

## 2025-03-12 PROCEDURE — 96361 HYDRATE IV INFUSION ADD-ON: CPT

## 2025-03-12 PROCEDURE — 85004 AUTOMATED DIFF WBC COUNT: CPT | Performed by: EMERGENCY MEDICINE

## 2025-03-12 PROCEDURE — 85049 AUTOMATED PLATELET COUNT: CPT | Performed by: EMERGENCY MEDICINE

## 2025-03-12 PROCEDURE — 84155 ASSAY OF PROTEIN SERUM: CPT | Performed by: EMERGENCY MEDICINE

## 2025-03-12 PROCEDURE — 36415 COLL VENOUS BLD VENIPUNCTURE: CPT | Performed by: EMERGENCY MEDICINE

## 2025-03-12 PROCEDURE — 80053 COMPREHEN METABOLIC PANEL: CPT | Performed by: EMERGENCY MEDICINE

## 2025-03-12 PROCEDURE — 250N000011 HC RX IP 250 OP 636: Performed by: EMERGENCY MEDICINE

## 2025-03-12 PROCEDURE — 258N000003 HC RX IP 258 OP 636: Performed by: EMERGENCY MEDICINE

## 2025-03-12 PROCEDURE — 82247 BILIRUBIN TOTAL: CPT | Performed by: EMERGENCY MEDICINE

## 2025-03-12 RX ORDER — ONDANSETRON 2 MG/ML
4 INJECTION INTRAMUSCULAR; INTRAVENOUS ONCE
Status: COMPLETED | OUTPATIENT
Start: 2025-03-12 | End: 2025-03-12

## 2025-03-12 RX ADMIN — ONDANSETRON 4 MG: 2 INJECTION, SOLUTION INTRAMUSCULAR; INTRAVENOUS at 20:14

## 2025-03-12 RX ADMIN — SODIUM CHLORIDE 1000 ML: 0.9 INJECTION, SOLUTION INTRAVENOUS at 20:13

## 2025-03-12 ASSESSMENT — ACTIVITIES OF DAILY LIVING (ADL)
ADLS_ACUITY_SCORE: 41

## 2025-03-12 ASSESSMENT — COLUMBIA-SUICIDE SEVERITY RATING SCALE - C-SSRS
6. HAVE YOU EVER DONE ANYTHING, STARTED TO DO ANYTHING, OR PREPARED TO DO ANYTHING TO END YOUR LIFE?: NO
2. HAVE YOU ACTUALLY HAD ANY THOUGHTS OF KILLING YOURSELF IN THE PAST MONTH?: NO
1. IN THE PAST MONTH, HAVE YOU WISHED YOU WERE DEAD OR WISHED YOU COULD GO TO SLEEP AND NOT WAKE UP?: NO

## 2025-03-13 VITALS
HEART RATE: 123 BPM | RESPIRATION RATE: 20 BRPM | HEIGHT: 69 IN | TEMPERATURE: 99.8 F | WEIGHT: 190 LBS | BODY MASS INDEX: 28.14 KG/M2 | OXYGEN SATURATION: 94 % | DIASTOLIC BLOOD PRESSURE: 84 MMHG | SYSTOLIC BLOOD PRESSURE: 140 MMHG

## 2025-03-13 PROCEDURE — 258N000003 HC RX IP 258 OP 636: Performed by: EMERGENCY MEDICINE

## 2025-03-13 PROCEDURE — 250N000013 HC RX MED GY IP 250 OP 250 PS 637: Performed by: EMERGENCY MEDICINE

## 2025-03-13 PROCEDURE — 96361 HYDRATE IV INFUSION ADD-ON: CPT

## 2025-03-13 PROCEDURE — 250N000011 HC RX IP 250 OP 636: Performed by: EMERGENCY MEDICINE

## 2025-03-13 RX ORDER — IBUPROFEN 600 MG/1
600 TABLET, FILM COATED ORAL ONCE
Status: COMPLETED | OUTPATIENT
Start: 2025-03-13 | End: 2025-03-13

## 2025-03-13 RX ORDER — ONDANSETRON 4 MG/1
4 TABLET, ORALLY DISINTEGRATING ORAL EVERY 8 HOURS PRN
Qty: 10 TABLET | Refills: 0 | Status: SHIPPED | OUTPATIENT
Start: 2025-03-13 | End: 2025-03-16

## 2025-03-13 RX ORDER — ONDANSETRON 4 MG/1
4 TABLET, ORALLY DISINTEGRATING ORAL ONCE
Status: COMPLETED | OUTPATIENT
Start: 2025-03-13 | End: 2025-03-13

## 2025-03-13 RX ADMIN — IBUPROFEN 600 MG: 600 TABLET ORAL at 00:36

## 2025-03-13 RX ADMIN — SODIUM CHLORIDE 1000 ML: 0.9 INJECTION, SOLUTION INTRAVENOUS at 00:00

## 2025-03-13 RX ADMIN — ONDANSETRON 4 MG: 4 TABLET, ORALLY DISINTEGRATING ORAL at 01:11

## 2025-03-13 ASSESSMENT — ACTIVITIES OF DAILY LIVING (ADL): ADLS_ACUITY_SCORE: 47

## 2025-03-13 NOTE — ED TRIAGE NOTES
Pt reports fevers, nausea, vomiting and diarrhea since yesterday - recently returned from [place] Monday night. Unable to keep any food/fluids down     Triage Assessment (Adult)       Row Name 03/12/25 2007          Respiratory WDL    Respiratory WDL WDL        Cardiac WDL    Cardiac WDL WDL        Cognitive/Neuro/Behavioral WDL    Cognitive/Neuro/Behavioral WDL WDL

## 2025-03-13 NOTE — ED PROVIDER NOTES
"  Emergency Department Note      History of Present Illness     Chief Complaint   Nausea, Vomiting, & Diarrhea      HPI   Makenna Abbott is a 64 year old male with a history of chronic myelocytic leukemia, presenting to the emergency department for evaluation of nausea, vomiting, and diarrhea. The patient's wife reports that she and the patient recently returned from the [place] on 3/10/25. She reports that her and the patient were both feeling well on 3/10/25 and 3/11/25, but last night, the patient began experiencing nausea, vomiting, and diarrhea. She notes the patient has had one emesis and one episode of diarrhea since arriving to the emergency department this evening, and the patient reports that the Zofran has helped relieve his nausea and vomiting. The patient's wife reports that they ate the same food and stayed at a [employer] hotel. The patient states that he has been feeling feverish since the onset of his symptoms.      Independent Historian   The patient's wife as detailed above in the HPI.     Review of External Notes   None    Past Medical History     Medical History and Problem List   Anxiety   BPH with elevated PSA  Calculus of the gallbladder without cholecystitis with obstruction  CML  Depression  ED  GERD  Gilbert's syndrome     Medications   Norco  Imatinib   Senna-docusate     Surgical History   Colonoscopy  Davinci laparoscopic cholecystectomy without grams  Eye surgery   Genitourinary surgery   GI surgery     Physical Exam     Patient Vitals for the past 24 hrs:   BP Temp Pulse Resp SpO2 Height Weight   03/12/25 2315 (!) 140/84 -- -- -- -- -- --   03/12/25 1959 101/71 99.8  F (37.7  C) (!) 123 20 96 % 1.753 m (5' 9\") 86.2 kg (190 lb)     Physical Exam  General: Alert, No distress. Nontoxic appearance  Head: No signs of trauma.   Mouth/Throat: Dry mucous membranes  Eyes: Conjunctivae are normal. Pupils are equal..   Neck: Normal range of motion.    CV: Appears well " perfused.  Resp:No respiratory distress.  Abd: No reproducible abdominal tenderness.    MSK: Normal range of motion. No obvious deformity.   Neuro: The patient is alert and interactive. CESAR. Speech normal. GCS 15  Skin: No lesion or sign of trauma noted.   Psych: normal mood and affect. behavior is normal.       Diagnostics     Lab Results   Labs Ordered and Resulted from Time of ED Arrival to Time of ED Departure   COMPREHENSIVE METABOLIC PANEL - Abnormal       Result Value    Sodium 140      Potassium 3.6      Carbon Dioxide (CO2) 25      Anion Gap 13      Urea Nitrogen 19.1      Creatinine 1.18 (*)     GFR Estimate 70      Calcium 9.4      Chloride 102      Glucose 122 (*)     Alkaline Phosphatase 74      AST 27      ALT 38      Protein Total 7.7      Albumin 4.5      Bilirubin Total 0.5     CBC WITH PLATELETS AND DIFFERENTIAL - Abnormal    WBC Count 10.4      RBC Count 6.29 (*)     Hemoglobin 15.8      Hematocrit 49.1      MCV 78      MCH 25.1 (*)     MCHC 32.2      RDW 13.6      Platelet Count 270      % Neutrophils 88      % Lymphocytes 6      % Monocytes 6      % Eosinophils 0      % Basophils 0      % Immature Granulocytes 0      NRBCs per 100 WBC 0      Absolute Neutrophils 9.1 (*)     Absolute Lymphocytes 0.6 (*)     Absolute Monocytes 0.6      Absolute Eosinophils 0.0      Absolute Basophils 0.0      Absolute Immature Granulocytes 0.0      Absolute NRBCs 0.0     LIPASE - Normal    Lipase 27         Imaging   No orders to display       EKG   None     Independent Interpretation   None    ED Course      Medications Administered   Medications   sodium chloride 0.9% BOLUS 1,000 mL (0 mLs Intravenous Stopped 3/12/25 2311)   ondansetron (ZOFRAN) injection 4 mg (4 mg Intravenous $Given 3/12/25 2014)   sodium chloride 0.9% BOLUS 1,000 mL (0 mLs Intravenous Stopped 3/13/25 0041)   ibuprofen (ADVIL/MOTRIN) tablet 600 mg (600 mg Oral $Given 3/13/25 0036)   ondansetron (ZOFRAN ODT) ODT tab 4 mg (4 mg Oral $Given  3/13/25 0111)       Procedures   Procedures     Discussion of Management   None    ED Course   ED Course as of 03/13/25 0112   Wed Mar 12, 2025   2352 I obtained history and examined the patient as noted above.    Thu Mar 13, 2025   0102 I reevaluated the patient, and I spoke to him regarding his laboratory results. I let him know I am comfortable discharging him home. He is amenable to this.          Additional Documentation  None    Medical Decision Making / Diagnosis     CMS Diagnoses: None    MIPS       None    MDM   Makenna Abbott is a 64 year old male presents to the ED after arriving home from the [place].  The patient started having vomiting then diarrhea.  He has no significant abdominal discomfort.  The patient was treated with the above medications and IV fluids.  He is feeling much better and is able to now drink without further vomiting.  No indications for further emergent evaluation.  The patient will follow-up with his primary care doctor as needed.    Disposition   The patient was discharged.     Diagnosis     ICD-10-CM    1. Vomiting and diarrhea  R11.10     R19.7       2. Dehydration  E86.0            Discharge Medications   New Prescriptions    ONDANSETRON (ZOFRAN ODT) 4 MG ODT TAB    Take 1 tablet (4 mg) by mouth every 8 hours as needed for nausea.         Scribe Disclosure:  I, Carlos Enrique Sin, am serving as a scribe at 12:04 AM on 3/13/2025 to document services personally performed by Nirmal Billings MD based on my observations and the provider's statements to me.        Nirmal Billings MD  03/13/25 7921

## (undated) DEVICE — ESU PENCIL W/HOLSTER

## (undated) DEVICE — LUBRICANT INST KIT ENDO-LUBE 220-90

## (undated) DEVICE — ESU GROUND PAD UNIVERSAL W/O CORD

## (undated) DEVICE — DAVINCI XI OBTURATOR BLADELESS 8MM 470359

## (undated) DEVICE — ADH SKIN CLOSURE PREMIERPRO EXOFIN 1.0ML 3470

## (undated) DEVICE — SU VICRYL 0 UR-6 27" J603H

## (undated) DEVICE — GLOVE PROTEXIS W/NEU-THERA 6.5  2D73TE65

## (undated) DEVICE — PREP CHLORAPREP 26ML TINTED ORANGE  260815

## (undated) DEVICE — LINEN TOWEL PACK X5 5464

## (undated) DEVICE — DRAPE BREAST/CHEST 29420

## (undated) DEVICE — PACK LAP CHOLE SLC15LCFSD

## (undated) DEVICE — SOL NACL 0.9% INJ 1000ML BAG 2B1324X

## (undated) DEVICE — CLIP ENDO HEMO-LOC GREEN MED/LG 544230

## (undated) DEVICE — DAVINCI XI SEAL UNIVERSAL 5-8MM 470361

## (undated) DEVICE — DAVINCI HOT SHEARS TIP COVER  400180

## (undated) DEVICE — ENDO TROCAR FIRST ENTRY KII FIOS Z-THRD 05X100MM CTF03

## (undated) DEVICE — DAVINCI XI DRAPE COLUMN 470341

## (undated) DEVICE — DRAPE SHEET REV FOLD 3/4 9349

## (undated) DEVICE — DAVINCI XI DRAPE ARM 470015

## (undated) DEVICE — SU MONOCRYL 4-0 PS-2 18" UND Y496G

## (undated) DEVICE — POUCH TISSUE RETRIEVAL ROBOTIC 8MM 5.1" INTRO TRS-ROBO-8

## (undated) DEVICE — LUBRICANT INST ELECTROLUBE EL101

## (undated) DEVICE — SYSTEM CLEARIFY VISUALIZATION 21-345

## (undated) DEVICE — LIGHT HANDLE X2

## (undated) DEVICE — SOL WATER IRRIG 1000ML BOTTLE 2F7114

## (undated) RX ORDER — OXYCODONE HYDROCHLORIDE 5 MG/1
TABLET ORAL
Status: DISPENSED
Start: 2021-12-13

## (undated) RX ORDER — CEFAZOLIN SODIUM 2 G/100ML
INJECTION, SOLUTION INTRAVENOUS
Status: DISPENSED
Start: 2021-12-13

## (undated) RX ORDER — DEXAMETHASONE SODIUM PHOSPHATE 4 MG/ML
INJECTION, SOLUTION INTRA-ARTICULAR; INTRALESIONAL; INTRAMUSCULAR; INTRAVENOUS; SOFT TISSUE
Status: DISPENSED
Start: 2021-12-13

## (undated) RX ORDER — GLYCOPYRROLATE 0.2 MG/ML
INJECTION, SOLUTION INTRAMUSCULAR; INTRAVENOUS
Status: DISPENSED
Start: 2021-12-13

## (undated) RX ORDER — PROPOFOL 10 MG/ML
INJECTION, EMULSION INTRAVENOUS
Status: DISPENSED
Start: 2021-12-13

## (undated) RX ORDER — HYDROXYZINE HYDROCHLORIDE 50 MG/ML
INJECTION, SOLUTION INTRAMUSCULAR
Status: DISPENSED
Start: 2021-12-13

## (undated) RX ORDER — ONDANSETRON 2 MG/ML
INJECTION INTRAMUSCULAR; INTRAVENOUS
Status: DISPENSED
Start: 2021-12-13

## (undated) RX ORDER — NEOSTIGMINE METHYLSULFATE 1 MG/ML
VIAL (ML) INJECTION
Status: DISPENSED
Start: 2021-12-13

## (undated) RX ORDER — BUPIVACAINE HYDROCHLORIDE 5 MG/ML
INJECTION, SOLUTION EPIDURAL; INTRACAUDAL
Status: DISPENSED
Start: 2021-12-13

## (undated) RX ORDER — FENTANYL CITRATE 0.05 MG/ML
INJECTION, SOLUTION INTRAMUSCULAR; INTRAVENOUS
Status: DISPENSED
Start: 2021-12-13

## (undated) RX ORDER — LIDOCAINE HYDROCHLORIDE 20 MG/ML
INJECTION, SOLUTION EPIDURAL; INFILTRATION; INTRACAUDAL; PERINEURAL
Status: DISPENSED
Start: 2021-12-13

## (undated) RX ORDER — HYDROMORPHONE HYDROCHLORIDE 1 MG/ML
INJECTION, SOLUTION INTRAMUSCULAR; INTRAVENOUS; SUBCUTANEOUS
Status: DISPENSED
Start: 2021-12-13

## (undated) RX ORDER — FENTANYL CITRATE 50 UG/ML
INJECTION, SOLUTION INTRAMUSCULAR; INTRAVENOUS
Status: DISPENSED
Start: 2021-12-13

## (undated) RX ORDER — HYDROMORPHONE HCL IN WATER/PF 6 MG/30 ML
PATIENT CONTROLLED ANALGESIA SYRINGE INTRAVENOUS
Status: DISPENSED
Start: 2021-12-13